# Patient Record
Sex: FEMALE | Race: WHITE | NOT HISPANIC OR LATINO | Employment: UNEMPLOYED | URBAN - METROPOLITAN AREA
[De-identification: names, ages, dates, MRNs, and addresses within clinical notes are randomized per-mention and may not be internally consistent; named-entity substitution may affect disease eponyms.]

---

## 2017-06-12 ENCOUNTER — HISTORICAL (OUTPATIENT)
Dept: RADIOLOGY | Facility: HOSPITAL | Age: 42
End: 2017-06-12

## 2019-04-17 ENCOUNTER — HISTORICAL (OUTPATIENT)
Dept: RESPIRATORY THERAPY | Facility: HOSPITAL | Age: 44
End: 2019-04-17

## 2022-06-15 ENCOUNTER — HOSPITAL ENCOUNTER (OUTPATIENT)
Dept: RADIOLOGY | Facility: HOSPITAL | Age: 47
Discharge: HOME | End: 2022-06-15
Attending: OBSTETRICS & GYNECOLOGY
Payer: MEDICAID

## 2022-06-15 DIAGNOSIS — Z12.31 BREAST CANCER SCREENING BY MAMMOGRAM: ICD-10-CM

## 2022-06-15 PROCEDURE — 77063 BREAST TOMOSYNTHESIS BI: CPT | Mod: 26,,, | Performed by: RADIOLOGY

## 2022-06-15 PROCEDURE — 77067 MAMMO DIGITAL SCREENING BILAT WITH TOMO: ICD-10-PCS | Mod: 26,,, | Performed by: RADIOLOGY

## 2022-06-15 PROCEDURE — 77063 MAMMO DIGITAL SCREENING BILAT WITH TOMO: ICD-10-PCS | Mod: 26,,, | Performed by: RADIOLOGY

## 2022-06-15 PROCEDURE — 77067 SCR MAMMO BI INCL CAD: CPT | Mod: TC

## 2022-06-15 PROCEDURE — 77067 SCR MAMMO BI INCL CAD: CPT | Mod: 26,,, | Performed by: RADIOLOGY

## 2023-03-06 ENCOUNTER — HOSPITAL ENCOUNTER (OUTPATIENT)
Dept: RADIOLOGY | Facility: HOSPITAL | Age: 48
Discharge: HOME | End: 2023-03-06
Attending: NURSE PRACTITIONER
Payer: MEDICAID

## 2023-03-06 ENCOUNTER — CLINICAL SUPPORT (OUTPATIENT)
Dept: RESPIRATORY THERAPY | Facility: HOSPITAL | Age: 48
End: 2023-03-06
Attending: NURSE PRACTITIONER
Payer: MEDICAID

## 2023-03-06 DIAGNOSIS — Z01.818 PREOPERATIVE EXAMINATION, UNSPECIFIED: ICD-10-CM

## 2023-03-06 DIAGNOSIS — Z01.818 OTHER SPECIFIED PRE-OPERATIVE EXAMINATION: Primary | ICD-10-CM

## 2023-03-06 DIAGNOSIS — Z01.818 OTHER SPECIFIED PRE-OPERATIVE EXAMINATION: ICD-10-CM

## 2023-03-06 PROCEDURE — 71045 X-RAY EXAM CHEST 1 VIEW: CPT | Mod: TC

## 2023-03-06 PROCEDURE — 93005 ELECTROCARDIOGRAM TRACING: CPT

## 2023-03-06 PROCEDURE — 93010 ELECTROCARDIOGRAM REPORT: CPT | Mod: ,,, | Performed by: STUDENT IN AN ORGANIZED HEALTH CARE EDUCATION/TRAINING PROGRAM

## 2023-03-06 PROCEDURE — 93010 EKG 12-LEAD: ICD-10-PCS | Mod: ,,, | Performed by: STUDENT IN AN ORGANIZED HEALTH CARE EDUCATION/TRAINING PROGRAM

## 2023-03-24 ENCOUNTER — APPOINTMENT (OUTPATIENT)
Dept: RADIATION THERAPY | Facility: HOSPITAL | Age: 48
End: 2023-03-24
Attending: RADIOLOGY
Payer: MEDICAID

## 2023-03-24 ENCOUNTER — TELEPHONE (OUTPATIENT)
Dept: HEMATOLOGY/ONCOLOGY | Facility: CLINIC | Age: 48
End: 2023-03-24
Payer: MEDICAID

## 2023-03-24 PROCEDURE — 77334 RADIATION TREATMENT AID(S): CPT | Performed by: RADIOLOGY

## 2023-03-24 PROCEDURE — 77290 THER RAD SIMULAJ FIELD CPLX: CPT | Performed by: RADIOLOGY

## 2023-03-28 PROCEDURE — 77300 RADIATION THERAPY DOSE PLAN: CPT | Performed by: RADIOLOGY

## 2023-03-28 PROCEDURE — 77334 RADIATION TREATMENT AID(S): CPT | Performed by: RADIOLOGY

## 2023-03-28 PROCEDURE — 77295 3-D RADIOTHERAPY PLAN: CPT | Performed by: RADIOLOGY

## 2023-03-30 ENCOUNTER — OFFICE VISIT (OUTPATIENT)
Dept: HEMATOLOGY/ONCOLOGY | Facility: CLINIC | Age: 48
End: 2023-03-30
Payer: MEDICAID

## 2023-03-30 VITALS
HEIGHT: 65 IN | WEIGHT: 214.38 LBS | HEART RATE: 101 BPM | RESPIRATION RATE: 18 BRPM | OXYGEN SATURATION: 95 % | TEMPERATURE: 98 F | SYSTOLIC BLOOD PRESSURE: 158 MMHG | BODY MASS INDEX: 35.72 KG/M2 | DIASTOLIC BLOOD PRESSURE: 83 MMHG

## 2023-03-30 DIAGNOSIS — Z51.11 ENCOUNTER FOR CHEMOTHERAPY MANAGEMENT: ICD-10-CM

## 2023-03-30 DIAGNOSIS — C53.9 MALIGNANT NEOPLASM OF CERVIX, UNSPECIFIED SITE: Primary | ICD-10-CM

## 2023-03-30 DIAGNOSIS — C53.8 MALIGNANT NEOPLASM OF OVERLAPPING SITES OF CERVIX: ICD-10-CM

## 2023-03-30 DIAGNOSIS — K59.00 CONSTIPATION, UNSPECIFIED CONSTIPATION TYPE: ICD-10-CM

## 2023-03-30 DIAGNOSIS — R11.0 NAUSEA: Primary | ICD-10-CM

## 2023-03-30 PROCEDURE — 3008F PR BODY MASS INDEX (BMI) DOCUMENTED: ICD-10-PCS | Mod: CPTII,,, | Performed by: STUDENT IN AN ORGANIZED HEALTH CARE EDUCATION/TRAINING PROGRAM

## 2023-03-30 PROCEDURE — 1160F RVW MEDS BY RX/DR IN RCRD: CPT | Mod: CPTII,,, | Performed by: STUDENT IN AN ORGANIZED HEALTH CARE EDUCATION/TRAINING PROGRAM

## 2023-03-30 PROCEDURE — 3077F PR MOST RECENT SYSTOLIC BLOOD PRESSURE >= 140 MM HG: ICD-10-PCS | Mod: CPTII,,, | Performed by: STUDENT IN AN ORGANIZED HEALTH CARE EDUCATION/TRAINING PROGRAM

## 2023-03-30 PROCEDURE — 99205 OFFICE O/P NEW HI 60 MIN: CPT | Mod: S$PBB,,, | Performed by: STUDENT IN AN ORGANIZED HEALTH CARE EDUCATION/TRAINING PROGRAM

## 2023-03-30 PROCEDURE — 99213 OFFICE O/P EST LOW 20 MIN: CPT | Mod: PBBFAC | Performed by: STUDENT IN AN ORGANIZED HEALTH CARE EDUCATION/TRAINING PROGRAM

## 2023-03-30 PROCEDURE — 1159F MED LIST DOCD IN RCRD: CPT | Mod: CPTII,,, | Performed by: STUDENT IN AN ORGANIZED HEALTH CARE EDUCATION/TRAINING PROGRAM

## 2023-03-30 PROCEDURE — 3079F PR MOST RECENT DIASTOLIC BLOOD PRESSURE 80-89 MM HG: ICD-10-PCS | Mod: CPTII,,, | Performed by: STUDENT IN AN ORGANIZED HEALTH CARE EDUCATION/TRAINING PROGRAM

## 2023-03-30 PROCEDURE — 3079F DIAST BP 80-89 MM HG: CPT | Mod: CPTII,,, | Performed by: STUDENT IN AN ORGANIZED HEALTH CARE EDUCATION/TRAINING PROGRAM

## 2023-03-30 PROCEDURE — 99999 PR PBB SHADOW E&M-EST. PATIENT-LVL III: ICD-10-PCS | Mod: PBBFAC,,, | Performed by: STUDENT IN AN ORGANIZED HEALTH CARE EDUCATION/TRAINING PROGRAM

## 2023-03-30 PROCEDURE — 99205 PR OFFICE/OUTPT VISIT, NEW, LEVL V, 60-74 MIN: ICD-10-PCS | Mod: S$PBB,,, | Performed by: STUDENT IN AN ORGANIZED HEALTH CARE EDUCATION/TRAINING PROGRAM

## 2023-03-30 PROCEDURE — 3008F BODY MASS INDEX DOCD: CPT | Mod: CPTII,,, | Performed by: STUDENT IN AN ORGANIZED HEALTH CARE EDUCATION/TRAINING PROGRAM

## 2023-03-30 PROCEDURE — 3077F SYST BP >= 140 MM HG: CPT | Mod: CPTII,,, | Performed by: STUDENT IN AN ORGANIZED HEALTH CARE EDUCATION/TRAINING PROGRAM

## 2023-03-30 PROCEDURE — 99999 PR PBB SHADOW E&M-EST. PATIENT-LVL III: CPT | Mod: PBBFAC,,, | Performed by: STUDENT IN AN ORGANIZED HEALTH CARE EDUCATION/TRAINING PROGRAM

## 2023-03-30 PROCEDURE — 1160F PR REVIEW ALL MEDS BY PRESCRIBER/CLIN PHARMACIST DOCUMENTED: ICD-10-PCS | Mod: CPTII,,, | Performed by: STUDENT IN AN ORGANIZED HEALTH CARE EDUCATION/TRAINING PROGRAM

## 2023-03-30 PROCEDURE — 1159F PR MEDICATION LIST DOCUMENTED IN MEDICAL RECORD: ICD-10-PCS | Mod: CPTII,,, | Performed by: STUDENT IN AN ORGANIZED HEALTH CARE EDUCATION/TRAINING PROGRAM

## 2023-03-30 RX ORDER — ONDANSETRON HYDROCHLORIDE 8 MG/1
8 TABLET, FILM COATED ORAL EVERY 8 HOURS PRN
Qty: 30 TABLET | Refills: 2 | Status: SHIPPED | OUTPATIENT
Start: 2023-03-30 | End: 2024-03-29

## 2023-03-30 RX ORDER — LORAZEPAM 1 MG/1
TABLET ORAL
COMMUNITY
Start: 2023-02-23 | End: 2023-04-12 | Stop reason: SDUPTHER

## 2023-03-30 RX ORDER — AMLODIPINE BESYLATE 5 MG/1
10 TABLET ORAL
COMMUNITY
Start: 2023-02-23 | End: 2023-08-08 | Stop reason: DRUGHIGH

## 2023-03-30 RX ORDER — OLANZAPINE 5 MG/1
5 TABLET ORAL NIGHTLY
Qty: 30 TABLET | Refills: 2 | Status: SHIPPED | OUTPATIENT
Start: 2023-03-30 | End: 2024-03-29

## 2023-03-30 RX ORDER — PROCHLORPERAZINE MALEATE 10 MG
10 TABLET ORAL EVERY 6 HOURS PRN
Qty: 30 TABLET | Refills: 1 | Status: SHIPPED | OUTPATIENT
Start: 2023-03-30 | End: 2024-03-29

## 2023-03-30 RX ORDER — POLYETHYLENE GLYCOL 3350 17 G/17G
17 POWDER, FOR SOLUTION ORAL DAILY
Qty: 30 PACKET | Refills: 0 | Status: SHIPPED | OUTPATIENT
Start: 2023-03-30

## 2023-03-30 RX ORDER — ZOLPIDEM TARTRATE 5 MG/1
TABLET ORAL
COMMUNITY
Start: 2023-03-06

## 2023-03-30 NOTE — PROGRESS NOTES
Subjective:       Patient ID: Lesley Rodriguez is a 47 y.o. female.    Chief Complaint: Cervical Cancer (Patient c/o intermittent vaginal bleeding and cramping.)    Diagnosis: Cervical Cancer Stage 2B     Current Treatment: None    Treatment History: None      HPI  47 yr old who presents for evaluation of cervical cancer in March 2023. Upon initial diagnosis in January, she had the option of definitive chemo/XRT vs total hysterectomy and decided to proceed with surgery with Dr. Rhodes. Unfortunately at the time of surgery, an exam was done that revealed obvious tumor extension onto the anterior of the vagina with some possible involvement of the left lateral vaginal apex. The tumor itself was thought to be approximately 5cm at the time of bimanual exam on 3/20. Given these new progressive findings it was decided that patient would be a better candidate for curative intent if she underwent concurrent chemo/RT. She has met with Dr. Gandhi and was referred to oncology for discussion of concurrent chemotherapy.     Today patient is accompanied by her two daughters. She has some interrmitent cervical bleeding and pain. Otherwise she has no complaints.     Family history includes a mother who had breast cancer at the age of 57 and her grandmother had ovarian cancer in her 40-50s and then developed breast cancer in her 90s as well as several other maternal family members with different types of malignancies.     I discussed her Diagnosis, prognosis, and recommended treatment options. I included NCCN guideline recommendations. I discussed the general toxicities of chemotherapy as well as specific concerns with cisplatin including ototoxicity, renal dysfunction, and neuropathy. She is willing to proceed.       Past Medical History:   Diagnosis Date    Essential (primary) hypertension       History reviewed. No pertinent surgical history.  Social History     Socioeconomic History    Marital status:       Family History    Problem Relation Age of Onset    Breast cancer Mother       Review of patient's allergies indicates:  No Known Allergies   Review of Systems   Constitutional:  Negative for activity change, fever and unexpected weight change.   HENT:  Negative for sore throat.    Eyes:  Negative for visual disturbance.   Respiratory:  Negative for cough and shortness of breath.    Cardiovascular:  Negative for chest pain.   Gastrointestinal:  Negative for abdominal pain, diarrhea, nausea and vomiting.   Endocrine: Negative for polyuria.   Genitourinary:  Negative for dysuria and hot flashes.   Integumentary:  Negative for rash.   Neurological:  Negative for weakness and headaches.   Hematological:  Negative for adenopathy. Bruises/bleeds easily.   Psychiatric/Behavioral:  Negative for confusion. The patient is nervous/anxious.        Objective:      Vitals:    03/30/23 1436   BP: (!) 158/83   Pulse: 101   Resp: 18   Temp: 98.4 °F (36.9 °C)       Physical Exam  Constitutional:       General: She is not in acute distress.     Appearance: Normal appearance. She is not ill-appearing.   HENT:      Head: Normocephalic and atraumatic.      Nose: Nose normal.      Mouth/Throat:      Mouth: Mucous membranes are moist.      Pharynx: Oropharynx is clear.   Eyes:      Extraocular Movements: Extraocular movements intact.      Conjunctiva/sclera: Conjunctivae normal.      Pupils: Pupils are equal, round, and reactive to light.   Cardiovascular:      Rate and Rhythm: Normal rate and regular rhythm.      Pulses: Normal pulses.      Heart sounds: Normal heart sounds. No murmur heard.  Pulmonary:      Effort: Pulmonary effort is normal. No respiratory distress.      Breath sounds: Normal breath sounds.   Abdominal:      General: There is no distension.      Palpations: Abdomen is soft.      Tenderness: There is no abdominal tenderness.   Musculoskeletal:         General: Normal range of motion.      Cervical back: Normal range of motion and neck  supple.      Right lower leg: No edema.      Left lower leg: No edema.   Lymphadenopathy:      Cervical: No cervical adenopathy.   Skin:     General: Skin is warm and dry.   Neurological:      General: No focal deficit present.      Mental Status: She is alert and oriented to person, place, and time.       LABS AND IMAGING REVIEWED IN [facility]    Pathology:  6/21/22: [facility]:IMPRESSION: NEGATIVE    5/28/22 PAP Smear:  Positive- High [place] HPV  Positive- HPV 16    12/6/22 PAP Smear:  Positive- High Risk HPV  Positive- HPV 16    1/3/23 Cervical Biopsy:  Cervical Biopsy, 6 o'clock        - Squamous cell carcinoma, invasive, moderately differentiated  2.   Cervical Biopsy, 8 o'clock,         - Squamous cell carcinoma, invasive, moderately differentiated  3.   Cervical Biopsy, 10 o'clock        - Squamous cell carcinoma, invasive, moderately differentiated  4.   ECC        - Squamous cell carcinoma, invasive, moderately differentiated     Comment: Squamous cell carcinoma comprises almost the entirely of the tissue. Carcinoma involves the full thickness of these biopsies and invades at least 3 mm.    2/3/2023 PET/CT:    Hypermetabolic activity noted within the cervix. No regional or distant metastatic disease.    2/10/23 MRI Pelvis:  4.3cm diameter primary cervical mass. No extension into the parametrial fat appreciated. Small but suspicious appearing left external iliac chain lymph nodes      Assessment:   Stage 2B SCC of Cervix  - Plan for definitive concurrent chemo/XRT with weekly cisplatin 40mg weekly        Plan:      - Refer for genetic testing given oncology family history  - Refer for complete audiogram given plan for cisplatin therapy  - Refer for mediport to Dr. Pollack. Okay if do PIV for C1 if mediport can't be done in time  - Will need chemo education for Cisplatin  - Will refer to dietician   - Will order pre meds  - Labs today:cbc, cmp, urine pregnancy   - rtc in 1 week with MD/labs/treat on 4/6. XRT planned to  begin on 4/5    Elizabeth Kennedy LeJeune, MD  Hematology/Oncology   [facility]

## 2023-03-31 DIAGNOSIS — C53.8 MALIGNANT NEOPLASM OF OVERLAPPING SITES OF CERVIX: ICD-10-CM

## 2023-03-31 DIAGNOSIS — C53.9 MALIGNANT NEOPLASM OF CERVIX, UNSPECIFIED SITE: Primary | ICD-10-CM

## 2023-04-03 ENCOUNTER — OFFICE VISIT (OUTPATIENT)
Dept: SURGERY | Facility: CLINIC | Age: 48
End: 2023-04-03
Payer: MEDICAID

## 2023-04-03 VITALS
OXYGEN SATURATION: 97 % | BODY MASS INDEX: 36.62 KG/M2 | HEART RATE: 80 BPM | DIASTOLIC BLOOD PRESSURE: 78 MMHG | WEIGHT: 219.81 LBS | SYSTOLIC BLOOD PRESSURE: 117 MMHG | RESPIRATION RATE: 18 BRPM | TEMPERATURE: 98 F | HEIGHT: 65 IN

## 2023-04-03 DIAGNOSIS — Z91.89 AT HIGH RISK FOR BREAST CANCER: ICD-10-CM

## 2023-04-03 DIAGNOSIS — I87.2 VENOUS INSUFFICIENCY: ICD-10-CM

## 2023-04-03 DIAGNOSIS — C53.8 MALIGNANT NEOPLASM OF OVERLAPPING SITES OF CERVIX: Primary | ICD-10-CM

## 2023-04-03 DIAGNOSIS — Z12.31 ENCOUNTER FOR SCREENING MAMMOGRAM FOR BREAST CANCER: ICD-10-CM

## 2023-04-03 PROBLEM — Z51.11 ENCOUNTER FOR CHEMOTHERAPY MANAGEMENT: Status: ACTIVE | Noted: 2023-04-03

## 2023-04-03 PROCEDURE — 1159F MED LIST DOCD IN RCRD: CPT | Mod: CPTII,,, | Performed by: SURGERY

## 2023-04-03 PROCEDURE — 1160F PR REVIEW ALL MEDS BY PRESCRIBER/CLIN PHARMACIST DOCUMENTED: ICD-10-PCS | Mod: CPTII,,, | Performed by: SURGERY

## 2023-04-03 PROCEDURE — 99999 PR PBB SHADOW E&M-EST. PATIENT-LVL IV: ICD-10-PCS | Mod: PBBFAC,,, | Performed by: SURGERY

## 2023-04-03 PROCEDURE — 3074F PR MOST RECENT SYSTOLIC BLOOD PRESSURE < 130 MM HG: ICD-10-PCS | Mod: CPTII,,, | Performed by: SURGERY

## 2023-04-03 PROCEDURE — 99205 OFFICE O/P NEW HI 60 MIN: CPT | Mod: S$PBB,,, | Performed by: SURGERY

## 2023-04-03 PROCEDURE — 3078F PR MOST RECENT DIASTOLIC BLOOD PRESSURE < 80 MM HG: ICD-10-PCS | Mod: CPTII,,, | Performed by: SURGERY

## 2023-04-03 PROCEDURE — 1159F PR MEDICATION LIST DOCUMENTED IN MEDICAL RECORD: ICD-10-PCS | Mod: CPTII,,, | Performed by: SURGERY

## 2023-04-03 PROCEDURE — 3008F BODY MASS INDEX DOCD: CPT | Mod: CPTII,,, | Performed by: SURGERY

## 2023-04-03 PROCEDURE — 3008F PR BODY MASS INDEX (BMI) DOCUMENTED: ICD-10-PCS | Mod: CPTII,,, | Performed by: SURGERY

## 2023-04-03 PROCEDURE — 1160F RVW MEDS BY RX/DR IN RCRD: CPT | Mod: CPTII,,, | Performed by: SURGERY

## 2023-04-03 PROCEDURE — 99205 PR OFFICE/OUTPT VISIT, NEW, LEVL V, 60-74 MIN: ICD-10-PCS | Mod: S$PBB,,, | Performed by: SURGERY

## 2023-04-03 PROCEDURE — 99999 PR PBB SHADOW E&M-EST. PATIENT-LVL IV: CPT | Mod: PBBFAC,,, | Performed by: SURGERY

## 2023-04-03 PROCEDURE — 3078F DIAST BP <80 MM HG: CPT | Mod: CPTII,,, | Performed by: SURGERY

## 2023-04-03 PROCEDURE — 99214 OFFICE O/P EST MOD 30 MIN: CPT | Mod: PBBFAC | Performed by: SURGERY

## 2023-04-03 PROCEDURE — 3074F SYST BP LT 130 MM HG: CPT | Mod: CPTII,,, | Performed by: SURGERY

## 2023-04-03 RX ORDER — IBUPROFEN 200 MG
200 TABLET ORAL EVERY 6 HOURS PRN
COMMUNITY
End: 2023-04-05 | Stop reason: CLARIF

## 2023-04-03 NOTE — PROGRESS NOTES
[facility] Breast Surg  Breast Surgical Oncology  New Patient Office Visit - H&P      Referring Provider: Dr. Cha GÓMEZ*   PCP: Frank Tellez Jr, MD     Chief Complaint:   Chief Complaint   Patient presents with    Pre-op Exam     New Patient, Breast Cancer        Subjective:     HPI:  Lesley Estrada is a 47 y.o. female who presents on 4/3/2023 for surgical discussion regarding Mediport Placement. Patient diagnosed with Stage 2B Cervical cancer.      She initially presented with intermittent cervical bleeding and pain. She under went work up 2023 with diagnosis of cervical cancer. She was offered definitive chemo/radiation versus surgical excision with total hysterectomy. She elected to undergo a total hysterectomy by Dr. Rhodes, however, at the time of surgery there was tumor extension noted into the anterior vagina and possible involvement of the left lateral vaginal apex.    She was sent to Dr. Gandhi and Dr. Lejeune for further evaluation and management. After evaluation it was noted that concurrent chemoradiation therapy would be the best next steps in her care.     Imagin. 2022 BL SC MG at [facility]- which revealed no significant masses, calcifications, or other findings are seen in either breast. There has been no significant interval change. BIRADS-1 Negative     2. 2/3/2023 PET/ CT which revealed hypermetabolic activity noted within the cervix. No regional or distant metastatic disease.    3. 2/10/2023 MRI Pelvis- which revealed a 4.3 cm diameter primary cervical mass. No extension into the parametrial fat appreciated. Small but suspicious appearing left external iliac chain lymph nodes      Pathology:  1/3/2023 Cervical Biopsy  Cervical Biopsy, 6 o'clock        - Squamous cell carcinoma, invasive, moderately differentiated  2.   Cervical Biopsy, 8 o'clock,         - Squamous cell carcinoma, invasive, moderately differentiated  3.   Cervical Biopsy, 10  o'clock        - Squamous cell carcinoma, invasive, moderately differentiated  4.   ECC        - Squamous cell carcinoma, invasive, moderately differentiated     Comment: Squamous cell carcinoma comprises almost the entirely of the tissue. Carcinoma involves the full thickness of these biopsies and invades at least 3 mm.       OB/GYN History:  Age at Menarche Onset: 13  Menopausal Status: premenopausal, LMP: 2023  Hysterectomy/Oophorectomy: NA, at age NA  Hormonal birth control (duration): Yes Depo-Provera injections. (Started in  for about 7 years)  Pregnancy History:   Age at first live birth: 17  Hormone Replacement Therapy: No, none  Patient did not breast feed.  Patient denies nipple discharge.   Patient denies to previous breast biopsy.   Patient denies to a personal history of breast cancer.    Other Relevant History:  Prior thoracic RT: none  Genetic testing: No  Ashkenazi Congregation descent: No    Family History:  Family History   Problem Relation Age of Onset    Breast cancer Mother 57    Other Father         Had stints in his heart    Ovarian cancer Maternal Grandmother         50s    Breast cancer Maternal Grandmother         late 80s    Stomach cancer Maternal Grandfather         late 60s, early 70s    Kidney failure Paternal Grandmother     Diabetes Paternal Grandmother     Suicide Paternal Grandfather     Breast cancer Paternal Aunt 75        Past History:  Past Medical History:   Diagnosis Date    Cancer     Breast Cancer    Essential (primary) hypertension         Past Surgical History:   Procedure Laterality Date    Arm surgery Left     Left        Social History     Socioeconomic History    Marital status:    Tobacco Use    Smoking status: Never    Smokeless tobacco: Never   Substance and Sexual Activity    Alcohol use: Yes    Drug use: Never    Sexual activity: Yes        Body mass index is 36.58 kg/m².     Allergy/Medications:   Review of patient's allergies indicates:  No  Known Allergies       Current Outpatient Medications:     amLODIPine (NORVASC) 5 MG tablet, , Disp: , Rfl:     ibuprofen (ADVIL,MOTRIN) 200 MG tablet, Take 200 mg by mouth every 6 (six) hours as needed for Pain. PRN, Disp: , Rfl:     LORazepam (ATIVAN) 1 MG tablet, As needed, Disp: , Rfl:     zolpidem (AMBIEN) 5 MG Tab, , Disp: , Rfl:     OLANZapine (ZYPREXA) 5 MG tablet, Take 1 tablet (5 mg total) by mouth nightly. (Patient not taking: Reported on 4/3/2023), Disp: 30 tablet, Rfl: 2    ondansetron (ZOFRAN) 8 MG tablet, Take 1 tablet (8 mg total) by mouth every 8 (eight) hours as needed for Nausea. (Patient not taking: Reported on 4/3/2023), Disp: 30 tablet, Rfl: 2    polyethylene glycol (GLYCOLAX) 17 gram PwPk, Take 17 g by mouth once daily. (Patient not taking: Reported on 4/3/2023), Disp: 30 packet, Rfl: 0    prochlorperazine (COMPAZINE) 10 MG tablet, Take 1 tablet (10 mg total) by mouth every 6 (six) hours as needed (nausea). (Patient not taking: Reported on 4/3/2023), Disp: 30 tablet, Rfl: 1       Review of Systems:  Review of Systems   Constitutional:  Negative for chills, fever and weight loss.   HENT:  Negative for sore throat.    Eyes:  Negative for blurred vision and double vision.   Respiratory:  Negative for cough and shortness of breath.    Cardiovascular:  Positive for leg swelling. Negative for chest pain and palpitations.        Ankles swelling   Gastrointestinal:  Negative for abdominal pain, constipation, diarrhea, heartburn, nausea and vomiting.   Genitourinary:  Positive for frequency. Negative for dysuria, flank pain, hematuria and urgency.   Musculoskeletal:  Negative for back pain, joint pain and myalgias.   Neurological:  Negative for dizziness and headaches.   Endo/Heme/Allergies:  Does not bruise/bleed easily.   Psychiatric/Behavioral:  Negative for depression. The patient is not nervous/anxious.         Objective:     Vitals:  Blood pressure 117/78, pulse 80, temperature 98 °F (36.7 °C),  "temperature source Oral, resp. rate 18, height 5' 5" (1.651 m), weight 99.7 kg (219 lb 12.8 oz), last menstrual period 04/03/2023, SpO2 97 %.      Physical Exam:  General: The patient is awake, alert and oriented times three. The patient is well nourished and in no acute distress.   Neck: There is no evidence of palpable cervical, supraclavicular or axillary adenopathy. The neck is supple. The thyroid is not enlarged.   Musculoskeletal: The patient has a normal range of motion of her bilateral upper extremities.   Chest: Examination of the chest wall fails to reveal any obvious abnormalities. The lungs are clear to auscultation bilaterally without rales, rhonchi, or wheezing.   Cardiovascular: The heart has a regular rate and rhythm without murmurs, gallops or rubs.  Breast:  Right: Examination of right breast fails to reveal any dominant masses or areas of significant focal nodularity. The nipple is everted without evidence of discharge. There is no skin dimpling with movement of the pectoralis. There are no significant skin changes overlying the breast.   Left: Examination of the left breast fails to reveal any dominant masses or areas of significant focal nodularity. The nipple is everted without evidence of discharge. There is no skin dimpling with movement of the pectoralis. There are no significant skin changes overlying the breast.  Abdomen: The abdomen is soft, flat, nontender and nondistended with no palpable masses or organomegaly.  Integumentary: no rashes or skin lesions present  Neurologic: cranial nerves intact, no signs of peripheral neurological deficit, motor/sensory function intact    Assessment and Discussion:      Cancer Staging   No matching staging information was found for the patient.      Encounter Diagnoses   Name Primary?    Malignant neoplasm of overlapping sites of cervix Yes    Venous insufficiency     At high risk for breast cancer     Encounter for screening mammogram for breast " cancer       She requires chemotherapy and has venous insufficiency.     I then spent about twenty minutes with the patient explaining the procedure of the port placement.  I explained to her that this would be an outpatient procedure with local anesthesia and sedation.  The port would be placed under her skin with a catheter which runs into the major vein in her neck or chest.  I explained to her my initial access site would be the subclavian vein which runs beneath the clavicle. If not successful, then the second option would be to do another incision in the neck to access the internal jugular vein.  The risks and complications of pain, bleeding, infection, blood clot, scarring, the inability to access the port, clogging of the port and pneumothorax/hemothorax requiring chest tube or additional surgery were all explained in detail.      Informed consent was obtained, and we shall arrange the surgery at the earliest available opportunity.    She is scheduled for radiation therapy to start this Wednesday and chemotherapy to start this Thursday.      We will also note she is at elevated lifetime risk for breast cancer based on TC v8 and she has already been referred to genetics.        Plan:     1. Surgery - Mediport placement  Tentative Date: 4/10/2023, will try to move it up  2. Preop - CBC, CMP reviewed from 3/30/2023  3. Surgical instructions and information were reviewed  4. At elevated lifetime risk for breast cancer - SC MG due June 2023, will discuss high-risk screening further after that imaging  5. Genetic Counseling and testing referral already sent will follow-up        All of questions were answered    Alexus Pollack MD  Breast Surgical Oncology     OFFICE VISIT CODING:    Non-face-to-face time included:  Yes Preparing to see the patient such as reviewing the patient record  Yes Obtaining and reviewing separately obtained history  Yes Independently interpreting results  Yes Documenting clinical  information in electronic health record  No Ordering appropriate medications  Yes Ordering appropriate tests  Yes Ordering appropriate procedures (including follow-up)  Yes Referring and communicating with other health care professionals (not separately reported)  Yes Care Coordination (not separately reported)    Face-to-face time included:  Yes Performing a medically necessary appropriate history, examination, and/or evaluation  Yes Communicating results to the patient/family/caregiver  Yes Counseling and educating the patient/family/caregiver  Yes Answering patient/family/caregiver questions    Total Time: 65 minutes    Total time includes both face-to-face and non-face-to-face time personally spent by myself on the day of the visit.

## 2023-04-03 NOTE — H&P (VIEW-ONLY)
[facility] Breast Surg  Breast Surgical Oncology  New Patient Office Visit - H&P      Referring Provider: Dr. Cha GÓMEZ*   PCP: Frank Tellez Jr, MD     Chief Complaint:   Chief Complaint   Patient presents with    Pre-op Exam     New Patient, Breast Cancer        Subjective:     HPI:  Lesley Estrada is a 47 y.o. female who presents on 4/3/2023 for surgical discussion regarding Mediport Placement. Patient diagnosed with Stage 2B Cervical cancer.      She initially presented with intermittent cervical bleeding and pain. She under went work up 2023 with diagnosis of cervical cancer. She was offered definitive chemo/radiation versus surgical excision with total hysterectomy. She elected to undergo a total hysterectomy by Dr. Rhodes, however, at the time of surgery there was tumor extension noted into the anterior vagina and possible involvement of the left lateral vaginal apex.    She was sent to Dr. Gandhi and Dr. Lejeune for further evaluation and management. After evaluation it was noted that concurrent chemoradiation therapy would be the best next steps in her care.     Imagin. 2022 BL SC MG at [facility]- which revealed no significant masses, calcifications, or other findings are seen in either breast. There has been no significant interval change. BIRADS-1 Negative     2. 2/3/2023 PET/ CT which revealed hypermetabolic activity noted within the cervix. No regional or distant metastatic disease.    3. 2/10/2023 MRI Pelvis- which revealed a 4.3 cm diameter primary cervical mass. No extension into the parametrial fat appreciated. Small but suspicious appearing left external iliac chain lymph nodes      Pathology:  1/3/2023 Cervical Biopsy  Cervical Biopsy, 6 o'clock        - Squamous cell carcinoma, invasive, moderately differentiated  2.   Cervical Biopsy, 8 o'clock,         - Squamous cell carcinoma, invasive, moderately differentiated  3.   Cervical Biopsy, 10  o'clock        - Squamous cell carcinoma, invasive, moderately differentiated  4.   ECC        - Squamous cell carcinoma, invasive, moderately differentiated     Comment: Squamous cell carcinoma comprises almost the entirely of the tissue. Carcinoma involves the full thickness of these biopsies and invades at least 3 mm.       OB/GYN History:  Age at Menarche Onset: 13  Menopausal Status: premenopausal, LMP: 2023  Hysterectomy/Oophorectomy: NA, at age NA  Hormonal birth control (duration): Yes Depo-Provera injections. (Started in  for about 7 years)  Pregnancy History:   Age at first live birth: 17  Hormone Replacement Therapy: No, none  Patient did not breast feed.  Patient denies nipple discharge.   Patient denies to previous breast biopsy.   Patient denies to a personal history of breast cancer.    Other Relevant History:  Prior thoracic RT: none  Genetic testing: No  Ashkenazi Zoroastrianism descent: No    Family History:  Family History   Problem Relation Age of Onset    Breast cancer Mother 57    Other Father         Had stints in his heart    Ovarian cancer Maternal Grandmother         50s    Breast cancer Maternal Grandmother         late 80s    Stomach cancer Maternal Grandfather         late 60s, early 70s    Kidney failure Paternal Grandmother     Diabetes Paternal Grandmother     Suicide Paternal Grandfather     Breast cancer Paternal Aunt 75        Past History:  Past Medical History:   Diagnosis Date    Cancer     Breast Cancer    Essential (primary) hypertension         Past Surgical History:   Procedure Laterality Date    Arm surgery Left     Left        Social History     Socioeconomic History    Marital status:    Tobacco Use    Smoking status: Never    Smokeless tobacco: Never   Substance and Sexual Activity    Alcohol use: Yes    Drug use: Never    Sexual activity: Yes        Body mass index is 36.58 kg/m².     Allergy/Medications:   Review of patient's allergies indicates:  No  Known Allergies       Current Outpatient Medications:     amLODIPine (NORVASC) 5 MG tablet, , Disp: , Rfl:     ibuprofen (ADVIL,MOTRIN) 200 MG tablet, Take 200 mg by mouth every 6 (six) hours as needed for Pain. PRN, Disp: , Rfl:     LORazepam (ATIVAN) 1 MG tablet, As needed, Disp: , Rfl:     zolpidem (AMBIEN) 5 MG Tab, , Disp: , Rfl:     OLANZapine (ZYPREXA) 5 MG tablet, Take 1 tablet (5 mg total) by mouth nightly. (Patient not taking: Reported on 4/3/2023), Disp: 30 tablet, Rfl: 2    ondansetron (ZOFRAN) 8 MG tablet, Take 1 tablet (8 mg total) by mouth every 8 (eight) hours as needed for Nausea. (Patient not taking: Reported on 4/3/2023), Disp: 30 tablet, Rfl: 2    polyethylene glycol (GLYCOLAX) 17 gram PwPk, Take 17 g by mouth once daily. (Patient not taking: Reported on 4/3/2023), Disp: 30 packet, Rfl: 0    prochlorperazine (COMPAZINE) 10 MG tablet, Take 1 tablet (10 mg total) by mouth every 6 (six) hours as needed (nausea). (Patient not taking: Reported on 4/3/2023), Disp: 30 tablet, Rfl: 1       Review of Systems:  Review of Systems   Constitutional:  Negative for chills, fever and weight loss.   HENT:  Negative for sore throat.    Eyes:  Negative for blurred vision and double vision.   Respiratory:  Negative for cough and shortness of breath.    Cardiovascular:  Positive for leg swelling. Negative for chest pain and palpitations.        Ankles swelling   Gastrointestinal:  Negative for abdominal pain, constipation, diarrhea, heartburn, nausea and vomiting.   Genitourinary:  Positive for frequency. Negative for dysuria, flank pain, hematuria and urgency.   Musculoskeletal:  Negative for back pain, joint pain and myalgias.   Neurological:  Negative for dizziness and headaches.   Endo/Heme/Allergies:  Does not bruise/bleed easily.   Psychiatric/Behavioral:  Negative for depression. The patient is not nervous/anxious.         Objective:     Vitals:  Blood pressure 117/78, pulse 80, temperature 98 °F (36.7 °C),  "temperature source Oral, resp. rate 18, height 5' 5" (1.651 m), weight 99.7 kg (219 lb 12.8 oz), last menstrual period 04/03/2023, SpO2 97 %.      Physical Exam:  General: The patient is awake, alert and oriented times three. The patient is well nourished and in no acute distress.   Neck: There is no evidence of palpable cervical, supraclavicular or axillary adenopathy. The neck is supple. The thyroid is not enlarged.   Musculoskeletal: The patient has a normal range of motion of her bilateral upper extremities.   Chest: Examination of the chest wall fails to reveal any obvious abnormalities. The lungs are clear to auscultation bilaterally without rales, rhonchi, or wheezing.   Cardiovascular: The heart has a regular rate and rhythm without murmurs, gallops or rubs.  Breast:  Right: Examination of right breast fails to reveal any dominant masses or areas of significant focal nodularity. The nipple is everted without evidence of discharge. There is no skin dimpling with movement of the pectoralis. There are no significant skin changes overlying the breast.   Left: Examination of the left breast fails to reveal any dominant masses or areas of significant focal nodularity. The nipple is everted without evidence of discharge. There is no skin dimpling with movement of the pectoralis. There are no significant skin changes overlying the breast.  Abdomen: The abdomen is soft, flat, nontender and nondistended with no palpable masses or organomegaly.  Integumentary: no rashes or skin lesions present  Neurologic: cranial nerves intact, no signs of peripheral neurological deficit, motor/sensory function intact    Assessment and Discussion:      Cancer Staging   No matching staging information was found for the patient.      Encounter Diagnoses   Name Primary?    Malignant neoplasm of overlapping sites of cervix Yes    Venous insufficiency     At high risk for breast cancer     Encounter for screening mammogram for breast " cancer       She requires chemotherapy and has venous insufficiency.     I then spent about twenty minutes with the patient explaining the procedure of the port placement.  I explained to her that this would be an outpatient procedure with local anesthesia and sedation.  The port would be placed under her skin with a catheter which runs into the major vein in her neck or chest.  I explained to her my initial access site would be the subclavian vein which runs beneath the clavicle. If not successful, then the second option would be to do another incision in the neck to access the internal jugular vein.  The risks and complications of pain, bleeding, infection, blood clot, scarring, the inability to access the port, clogging of the port and pneumothorax/hemothorax requiring chest tube or additional surgery were all explained in detail.      Informed consent was obtained, and we shall arrange the surgery at the earliest available opportunity.    She is scheduled for radiation therapy to start this Wednesday and chemotherapy to start this Thursday.      We will also note she is at elevated lifetime risk for breast cancer based on TC v8 and she has already been referred to genetics.        Plan:     1. Surgery - Mediport placement  Tentative Date: 4/10/2023, will try to move it up  2. Preop - CBC, CMP reviewed from 3/30/2023  3. Surgical instructions and information were reviewed  4. At elevated lifetime risk for breast cancer - SC MG due June 2023, will discuss high-risk screening further after that imaging  5. Genetic Counseling and testing referral already sent will follow-up        All of questions were answered    Alexus Pollack MD  Breast Surgical Oncology     OFFICE VISIT CODING:    Non-face-to-face time included:  Yes Preparing to see the patient such as reviewing the patient record  Yes Obtaining and reviewing separately obtained history  Yes Independently interpreting results  Yes Documenting clinical  information in electronic health record  No Ordering appropriate medications  Yes Ordering appropriate tests  Yes Ordering appropriate procedures (including follow-up)  Yes Referring and communicating with other health care professionals (not separately reported)  Yes Care Coordination (not separately reported)    Face-to-face time included:  Yes Performing a medically necessary appropriate history, examination, and/or evaluation  Yes Communicating results to the patient/family/caregiver  Yes Counseling and educating the patient/family/caregiver  Yes Answering patient/family/caregiver questions    Total Time: 65 minutes    Total time includes both face-to-face and non-face-to-face time personally spent by myself on the day of the visit.

## 2023-04-04 ENCOUNTER — OFFICE VISIT (OUTPATIENT)
Dept: HEMATOLOGY/ONCOLOGY | Facility: CLINIC | Age: 48
End: 2023-04-04
Payer: MEDICAID

## 2023-04-04 VITALS
DIASTOLIC BLOOD PRESSURE: 92 MMHG | BODY MASS INDEX: 36.48 KG/M2 | HEART RATE: 80 BPM | SYSTOLIC BLOOD PRESSURE: 151 MMHG | TEMPERATURE: 98 F | WEIGHT: 218.94 LBS | RESPIRATION RATE: 18 BRPM | HEIGHT: 65 IN | OXYGEN SATURATION: 100 %

## 2023-04-04 DIAGNOSIS — Z71.9 ENCOUNTER FOR EDUCATION: ICD-10-CM

## 2023-04-04 DIAGNOSIS — Z51.11 ENCOUNTER FOR CHEMOTHERAPY MANAGEMENT: ICD-10-CM

## 2023-04-04 DIAGNOSIS — C53.8 MALIGNANT NEOPLASM OF OVERLAPPING SITES OF CERVIX: ICD-10-CM

## 2023-04-04 DIAGNOSIS — C53.9 MALIGNANT NEOPLASM OF CERVIX, UNSPECIFIED SITE: Primary | ICD-10-CM

## 2023-04-04 PROCEDURE — 3080F DIAST BP >= 90 MM HG: CPT | Mod: CPTII,,,

## 2023-04-04 PROCEDURE — 1159F PR MEDICATION LIST DOCUMENTED IN MEDICAL RECORD: ICD-10-PCS | Mod: CPTII,,,

## 2023-04-04 PROCEDURE — 3077F PR MOST RECENT SYSTOLIC BLOOD PRESSURE >= 140 MM HG: ICD-10-PCS | Mod: CPTII,,,

## 2023-04-04 PROCEDURE — 1160F RVW MEDS BY RX/DR IN RCRD: CPT | Mod: CPTII,,,

## 2023-04-04 PROCEDURE — 99215 OFFICE O/P EST HI 40 MIN: CPT | Mod: S$PBB,,,

## 2023-04-04 PROCEDURE — 3080F PR MOST RECENT DIASTOLIC BLOOD PRESSURE >= 90 MM HG: ICD-10-PCS | Mod: CPTII,,,

## 2023-04-04 PROCEDURE — 3008F BODY MASS INDEX DOCD: CPT | Mod: CPTII,,,

## 2023-04-04 PROCEDURE — 1159F MED LIST DOCD IN RCRD: CPT | Mod: CPTII,,,

## 2023-04-04 PROCEDURE — 3008F PR BODY MASS INDEX (BMI) DOCUMENTED: ICD-10-PCS | Mod: CPTII,,,

## 2023-04-04 PROCEDURE — 3077F SYST BP >= 140 MM HG: CPT | Mod: CPTII,,,

## 2023-04-04 PROCEDURE — 1160F PR REVIEW ALL MEDS BY PRESCRIBER/CLIN PHARMACIST DOCUMENTED: ICD-10-PCS | Mod: CPTII,,,

## 2023-04-04 PROCEDURE — 99213 OFFICE O/P EST LOW 20 MIN: CPT | Mod: PBBFAC

## 2023-04-04 PROCEDURE — 99999 PR PBB SHADOW E&M-EST. PATIENT-LVL III: CPT | Mod: PBBFAC,,,

## 2023-04-04 PROCEDURE — 99999 PR PBB SHADOW E&M-EST. PATIENT-LVL III: ICD-10-PCS | Mod: PBBFAC,,,

## 2023-04-04 PROCEDURE — 99215 PR OFFICE/OUTPT VISIT, EST, LEVL V, 40-54 MIN: ICD-10-PCS | Mod: S$PBB,,,

## 2023-04-04 RX ORDER — SODIUM CHLORIDE 0.9 % (FLUSH) 0.9 %
10 SYRINGE (ML) INJECTION
Status: CANCELLED | OUTPATIENT
Start: 2023-04-06

## 2023-04-04 RX ORDER — SODIUM CHLORIDE, SODIUM LACTATE, POTASSIUM CHLORIDE, CALCIUM CHLORIDE 600; 310; 30; 20 MG/100ML; MG/100ML; MG/100ML; MG/100ML
INJECTION, SOLUTION INTRAVENOUS CONTINUOUS
Status: CANCELLED | OUTPATIENT
Start: 2023-04-04

## 2023-04-04 RX ORDER — HEPARIN 100 UNIT/ML
500 SYRINGE INTRAVENOUS
Status: CANCELLED | OUTPATIENT
Start: 2023-04-06

## 2023-04-04 NOTE — PROGRESS NOTES
Subjective:       Patient ID: Lesley Estrada is a 47 y.o. female.    Chief Complaint: Cervical Cancer (Pt reports no issues or concerns)    Diagnosis: Cervical Cancer Stage 2B     Current Treatment:   Cisplatin 40 mg q.week x6 cycles, rescanned with PET/CT three-month once completion of 6 cycles/radiation    Treatment History: None      HPI  47 yr old who presents for evaluation of cervical cancer in March 2023. Upon initial diagnosis in January, she had the option of definitive chemo/XRT vs total hysterectomy and decided to proceed with surgery with Dr. Rhodes. Unfortunately at the time of surgery, an exam was done that revealed obvious tumor extension onto the anterior of the vagina with some possible involvement of the left lateral vaginal apex. The tumor itself was thought to be approximately 5cm at the time of bimanual exam on 3/20. Given these new progressive findings it was decided that patient would be a better candidate for curative intent if she underwent concurrent chemo/RT. She has met with Dr. Gandhi and was referred to oncology for discussion of concurrent chemotherapy.     Today patient is accompanied by her two daughters. She has some interrmitent cervical bleeding and pain. Otherwise she has no complaints.     Family history includes a mother who had breast cancer at the age of 57 and her grandmother had ovarian cancer in her 40-50s and then developed breast cancer in her 90s as well as several other maternal family members with different types of malignancies.     I discussed her Diagnosis, prognosis, and recommended treatment options. I included NCCN guideline recommendations. I discussed the general toxicities of chemotherapy as well as specific concerns with cisplatin including ototoxicity, renal dysfunction, and neuropathy. She is willing to proceed.     Interval History:  She returns to clinic today for chemotherapy education for weekly cisplatin and for treatment clearance to begin her  1st cycle of cisplatin on 04/06/2023.  She presents today with her daughter.  She is doing well today, without any concerns or complaints.  She is scheduled to begin radiation tomorrow.  She is scheduled to have her MediPort placement 04/10/2023 with Dr. Pollack.  She has an appointment scheduled 4/5 with audiology for a baseline audiogram.  Chemotherapy was discussed in full detail with her and her daughter.  All questions and concerns were answered to the satisfaction of the patient.  Consent was obtained.  She denies any shortness of breath, chest pain, nausea, vomiting, diarrhea, changes in stool, pain, fatigue, recent hospitalizations or illnesses.      Past Medical History:   Diagnosis Date    Cancer 2023    Breast Cancer    Essential (primary) hypertension       Past Surgical History:   Procedure Laterality Date    Arm surgery Left     Left     Social History     Socioeconomic History    Marital status:    Tobacco Use    Smoking status: Never    Smokeless tobacco: Never   Substance and Sexual Activity    Alcohol use: Yes    Drug use: Never    Sexual activity: Yes      Family History   Problem Relation Age of Onset    Breast cancer Mother 57    Other Father         Had stints in his heart    Ovarian cancer Maternal Grandmother         50s    Breast cancer Maternal Grandmother         late 80s    Stomach cancer Maternal Grandfather         late 60s, early 70s    Kidney failure Paternal Grandmother     Diabetes Paternal Grandmother     Suicide Paternal Grandfather     Breast cancer Paternal Aunt 75      Review of patient's allergies indicates:  No Known Allergies       Review of Systems   Constitutional:  Negative for activity change, fever and unexpected weight change.   HENT:  Negative for sore throat.    Eyes:  Negative for visual disturbance.   Respiratory:  Negative for cough and shortness of breath.    Cardiovascular:  Negative for chest pain.   Gastrointestinal:  Negative for abdominal pain,  diarrhea, nausea and vomiting.   Endocrine: Negative for polyuria.   Genitourinary:  Negative for dysuria and hot flashes.   Integumentary:  Negative for rash.   Neurological:  Negative for weakness and headaches.   Hematological:  Negative for adenopathy. Does not bruise/bleed easily.   Psychiatric/Behavioral:  Negative for confusion. The patient is nervous/anxious.        Objective:      Vitals:    04/04/23 1407   BP: (!) 151/92   Pulse: 80   Resp: 18   Temp: 98.2 °F (36.8 °C)       Physical Exam  Constitutional:       General: She is not in acute distress.     Appearance: Normal appearance. She is not ill-appearing.   HENT:      Head: Normocephalic and atraumatic.      Nose: Nose normal.      Mouth/Throat:      Mouth: Mucous membranes are moist.      Pharynx: Oropharynx is clear.   Eyes:      Extraocular Movements: Extraocular movements intact.      Conjunctiva/sclera: Conjunctivae normal.      Pupils: Pupils are equal, round, and reactive to light.   Cardiovascular:      Rate and Rhythm: Normal rate and regular rhythm.      Pulses: Normal pulses.      Heart sounds: Normal heart sounds. No murmur heard.  Pulmonary:      Effort: Pulmonary effort is normal. No respiratory distress.      Breath sounds: Normal breath sounds.   Abdominal:      General: There is no distension.      Palpations: Abdomen is soft.      Tenderness: There is no abdominal tenderness.   Musculoskeletal:         General: Normal range of motion.      Cervical back: Normal range of motion and neck supple.      Right lower leg: No edema.      Left lower leg: No edema.   Lymphadenopathy:      Cervical: No cervical adenopathy.      Comments: No adenopathy noted bilaterally.   Skin:     General: Skin is warm and dry.   Neurological:      General: No focal deficit present.      Mental Status: She is alert and oriented to person, place, and time.       LABS AND IMAGING REVIEWED IN [facility]    Pathology:  6/21/22: [facility]:IMPRESSION: NEGATIVE    5/28/22 PAP  Smear:  Positive- High [place] HPV  Positive- HPV 16    12/6/22 PAP Smear:  Positive- High Risk HPV  Positive- HPV 16    1/3/23 Cervical Biopsy:  Cervical Biopsy, 6 o'clock        - Squamous cell carcinoma, invasive, moderately differentiated  2.   Cervical Biopsy, 8 o'clock,         - Squamous cell carcinoma, invasive, moderately differentiated  3.   Cervical Biopsy, 10 o'clock        - Squamous cell carcinoma, invasive, moderately differentiated  4.   ECC        - Squamous cell carcinoma, invasive, moderately differentiated     Comment: Squamous cell carcinoma comprises almost the entirely of the tissue. Carcinoma involves the full thickness of these biopsies and invades at least 3 mm.    2/3/2023 PET/CT:    Hypermetabolic activity noted within the cervix. No regional or distant metastatic disease.    2/10/23 MRI Pelvis:  4.3cm diameter primary cervical mass. No extension into the parametrial fat appreciated. Small but suspicious appearing left external iliac chain lymph nodes      Assessment:   Stage 2B SCC of Cervix   Plan for definitive concurrent chemo/XRT with weekly cisplatin 40mg weekly  Followed by Dr. Gandhi for radiation, due to begin radiation therapy 04/05/2023.  Audiology visit scheduled for 04/05/2023.  Currently scheduled for 04/06/2023 to begin cisplatin  MediPort currently scheduled for 04/10/2023 with Dr. Pollack  Family history of cancer  She has been referred to genetic testing        Plan:   Labs stable.   Continue with C1 of weekly cisplatin as scheduled 4/6/2023.  Mediport placement scheduled 4/10/2023.  Audiology appt scheduled 4/5/2023.  Radiation to begin 4/5/2023.  RTC in one week prior to chemo with NP for FU/lab/treat    Chemotherapy teaching was provided to patient. The plan of care including chemotherapy regimen, schedule expectations, potential side effects, as well as prevention/management of side effects were discussed in detail. Office contact information was provided along with  instructions on symptoms that warrant a call to the office, for example a temperature of > 100.5, uncontrolled side effects, severe fatigue etc. Time was given for patient/ family members to ask questions. All questions answered to the satisfaction of the patient and they verbalized understanding. They were instructed to call with any concerns or additional questions.     I spent 20 minutes preparing for education session reviewing clinic note, labs, scans, pathology, and treatment plan. I spent 40 minutes face-to-face with patient.       SAIRA Preston-C  Oncology/Hematology   Cancer Center [facility]

## 2023-04-05 ENCOUNTER — OFFICE VISIT (OUTPATIENT)
Dept: RADIATION THERAPY | Facility: HOSPITAL | Age: 48
End: 2023-04-05
Attending: RADIOLOGY
Payer: MEDICAID

## 2023-04-05 ENCOUNTER — CLINICAL SUPPORT (OUTPATIENT)
Dept: AUDIOLOGY | Facility: HOSPITAL | Age: 48
End: 2023-04-05
Payer: MEDICAID

## 2023-04-05 DIAGNOSIS — C53.8 MALIGNANT NEOPLASM OF OVERLAPPING SITES OF CERVIX: ICD-10-CM

## 2023-04-05 PROCEDURE — 77412 RADIATION TX DELIVERY LVL 3: CPT | Performed by: RADIOLOGY

## 2023-04-05 PROCEDURE — 77280 THER RAD SIMULAJ FIELD SMPL: CPT | Performed by: RADIOLOGY

## 2023-04-05 PROCEDURE — 92557 COMPREHENSIVE HEARING TEST: CPT

## 2023-04-05 PROCEDURE — 92567 TYMPANOMETRY: CPT

## 2023-04-05 RX ORDER — ACETAMINOPHEN 500 MG
1000 TABLET ORAL EVERY 6 HOURS PRN
COMMUNITY

## 2023-04-05 NOTE — PROGRESS NOTES
AUDIOLOGY AUDIOMETRIC EVALUATION      Name:   Lesley Estrada  :  1975  Age:  47 y.o.  Date of Evaluation:  2023    History:  Reason for visit:   is seen today at the request of Oncology to obtain a baseline evaluation of hearing.        Tinnitus:   never     Dizziness:  no    Noise Exposure: no    Aural Fullness:  no    Otalgia:  no    Family history of hearing loss:  no    Other significant history: none     Otoscopic Evaluation:  Normal ear canal/TM bilaterally          Immittance Measures: 226 Hz       Right Ear: Middle ear pressure and eardrm mobility within defined limits       Left Ear: Middle ear pressure and eardrm mobility within defined limits    Audiological Evaluation:    Test technique:  Pure Tone Audiometry     Reliability:   excellent    Pure Tone Audiometry:  Revealed normal hearing from 250-8000 Hz bilaterally.     Speech Audiometry:        Right Ear:  Speech Reception Threshold (SRT) was obtained at 10 dBHL                 Word Recognition scores were excellent in quiet when words were presented at 50 dBHL       Left Ear:  Speech Reception Threshold (SRT) was obtained at 10 dBHL                 Word Recognition scores were excellent in quiet when words were presented at 50 dBHL    Distortion Product Otoacoustic Emissions:        Right Ear: Present from 8574-1210 Hz, with exception of 3000 Hz        Left Ear:  Present from 3492-2912 Hz    Present OAEs suggest normal cochlear outer hair cell function.      IMPRESSIONS:  Today's test results are normal hearing sensitivity.   Patient was counseled with regard to the findings.    RECOMMENDATIONS:  Retest hearing immediately per any subjective changes per patient, or when finished with Chemo if no changes.     PATIENT EDUCATION:   Discussed results and recommendations with .    Questions were addressed and the patient was encouraged to contact our department should concerns arise.      Genet Santoyo,  CCC-A  Audiology Clinical Manager

## 2023-04-06 ENCOUNTER — INFUSION (OUTPATIENT)
Dept: INFUSION THERAPY | Facility: HOSPITAL | Age: 48
End: 2023-04-06
Attending: OBSTETRICS & GYNECOLOGY
Payer: MEDICAID

## 2023-04-06 VITALS
RESPIRATION RATE: 18 BRPM | OXYGEN SATURATION: 100 % | HEART RATE: 101 BPM | DIASTOLIC BLOOD PRESSURE: 83 MMHG | SYSTOLIC BLOOD PRESSURE: 136 MMHG | WEIGHT: 216 LBS | TEMPERATURE: 98 F | HEIGHT: 65 IN | BODY MASS INDEX: 35.99 KG/M2

## 2023-04-06 DIAGNOSIS — C53.9 MALIGNANT NEOPLASM OF CERVIX, UNSPECIFIED SITE: Primary | ICD-10-CM

## 2023-04-06 DIAGNOSIS — C53.8 MALIGNANT NEOPLASM OF OVERLAPPING SITES OF CERVIX: Primary | ICD-10-CM

## 2023-04-06 PROCEDURE — 96366 THER/PROPH/DIAG IV INF ADDON: CPT

## 2023-04-06 PROCEDURE — 77412 RADIATION TX DELIVERY LVL 3: CPT | Performed by: RADIOLOGY

## 2023-04-06 PROCEDURE — 96413 CHEMO IV INFUSION 1 HR: CPT

## 2023-04-06 PROCEDURE — 63600175 PHARM REV CODE 636 W HCPCS

## 2023-04-06 PROCEDURE — 96375 TX/PRO/DX INJ NEW DRUG ADDON: CPT

## 2023-04-06 PROCEDURE — 25000003 PHARM REV CODE 250

## 2023-04-06 PROCEDURE — 96367 TX/PROPH/DG ADDL SEQ IV INF: CPT

## 2023-04-06 PROCEDURE — 96360 HYDRATION IV INFUSION INIT: CPT

## 2023-04-06 RX ORDER — HEPARIN 100 UNIT/ML
500 SYRINGE INTRAVENOUS
Status: DISPENSED | OUTPATIENT
Start: 2023-04-06

## 2023-04-06 RX ORDER — SODIUM CHLORIDE 0.9 % (FLUSH) 0.9 %
10 SYRINGE (ML) INJECTION
Status: ACTIVE | OUTPATIENT
Start: 2023-04-06

## 2023-04-06 RX ADMIN — CISPLATIN 85 MG: 1 INJECTION INTRAVENOUS at 10:04

## 2023-04-06 RX ADMIN — APREPITANT 130 MG: 130 INJECTION, EMULSION INTRAVENOUS at 09:04

## 2023-04-06 RX ADMIN — POTASSIUM CHLORIDE: 2 INJECTION, SOLUTION, CONCENTRATE INTRAVENOUS at 12:04

## 2023-04-06 RX ADMIN — SODIUM CHLORIDE 1000 ML: 9 INJECTION, SOLUTION INTRAVENOUS at 09:04

## 2023-04-06 RX ADMIN — DEXAMETHASONE SODIUM PHOSPHATE 0.25 MG: 10 INJECTION, SOLUTION INTRAMUSCULAR; INTRAVENOUS at 10:04

## 2023-04-07 PROCEDURE — 77412 RADIATION TX DELIVERY LVL 3: CPT | Performed by: RADIOLOGY

## 2023-04-09 ENCOUNTER — ANESTHESIA EVENT (OUTPATIENT)
Dept: SURGERY | Facility: HOSPITAL | Age: 48
End: 2023-04-09
Payer: MEDICAID

## 2023-04-09 RX ORDER — SODIUM CHLORIDE, SODIUM LACTATE, POTASSIUM CHLORIDE, CALCIUM CHLORIDE 600; 310; 30; 20 MG/100ML; MG/100ML; MG/100ML; MG/100ML
INJECTION, SOLUTION INTRAVENOUS CONTINUOUS
Status: CANCELLED | OUTPATIENT
Start: 2023-04-09

## 2023-04-09 RX ORDER — SODIUM CHLORIDE 9 MG/ML
INJECTION, SOLUTION INTRAVENOUS CONTINUOUS
Status: CANCELLED | OUTPATIENT
Start: 2023-04-09

## 2023-04-09 RX ORDER — SODIUM CHLORIDE, SODIUM GLUCONATE, SODIUM ACETATE, POTASSIUM CHLORIDE AND MAGNESIUM CHLORIDE 30; 37; 368; 526; 502 MG/100ML; MG/100ML; MG/100ML; MG/100ML; MG/100ML
INJECTION, SOLUTION INTRAVENOUS CONTINUOUS
Status: CANCELLED | OUTPATIENT
Start: 2023-04-09 | End: 2023-05-09

## 2023-04-10 ENCOUNTER — HOSPITAL ENCOUNTER (OUTPATIENT)
Facility: HOSPITAL | Age: 48
Discharge: HOME | End: 2023-04-10
Attending: SURGERY | Admitting: SURGERY
Payer: MEDICAID

## 2023-04-10 ENCOUNTER — ANESTHESIA (OUTPATIENT)
Dept: SURGERY | Facility: HOSPITAL | Age: 48
End: 2023-04-10
Payer: MEDICAID

## 2023-04-10 ENCOUNTER — DOCUMENTATION ONLY (OUTPATIENT)
Dept: HEMATOLOGY/ONCOLOGY | Facility: CLINIC | Age: 48
End: 2023-04-10
Payer: MEDICAID

## 2023-04-10 VITALS
DIASTOLIC BLOOD PRESSURE: 86 MMHG | BODY MASS INDEX: 36.18 KG/M2 | OXYGEN SATURATION: 100 % | RESPIRATION RATE: 16 BRPM | TEMPERATURE: 97 F | WEIGHT: 217.13 LBS | HEIGHT: 65 IN | HEART RATE: 72 BPM | SYSTOLIC BLOOD PRESSURE: 145 MMHG

## 2023-04-10 DIAGNOSIS — Z98.890 S/P VASCULAR SURGERY: Primary | ICD-10-CM

## 2023-04-10 DIAGNOSIS — I87.2 VENOUS INSUFFICIENCY: ICD-10-CM

## 2023-04-10 DIAGNOSIS — C53.8 MALIGNANT NEOPLASM OF OVERLAPPING SITES OF CERVIX: ICD-10-CM

## 2023-04-10 LAB
B-HCG UR QL: NEGATIVE
CTP QC/QA: YES

## 2023-04-10 PROCEDURE — 77001 FLUOROGUIDE FOR VEIN DEVICE: CPT | Mod: 26,,, | Performed by: SURGERY

## 2023-04-10 PROCEDURE — 71000033 HC RECOVERY, INTIAL HOUR: Performed by: SURGERY

## 2023-04-10 PROCEDURE — 25000003 PHARM REV CODE 250: Performed by: NURSE ANESTHETIST, CERTIFIED REGISTERED

## 2023-04-10 PROCEDURE — D9220A PRA ANESTHESIA: ICD-10-PCS | Mod: ANES,,, | Performed by: ANESTHESIOLOGY

## 2023-04-10 PROCEDURE — 77001 CHG FLUOROGUIDE CNTRL VEN ACCESS,PLACE,REPLACE,REMOVE: ICD-10-PCS | Mod: 26,,, | Performed by: SURGERY

## 2023-04-10 PROCEDURE — 37000009 HC ANESTHESIA EA ADD 15 MINS: Performed by: SURGERY

## 2023-04-10 PROCEDURE — 25000003 PHARM REV CODE 250: Performed by: PHYSICIAN ASSISTANT

## 2023-04-10 PROCEDURE — 63600175 PHARM REV CODE 636 W HCPCS: Performed by: ANESTHESIOLOGY

## 2023-04-10 PROCEDURE — D9220A PRA ANESTHESIA: Mod: ANES,,, | Performed by: ANESTHESIOLOGY

## 2023-04-10 PROCEDURE — 77336 RADIATION PHYSICS CONSULT: CPT | Performed by: RADIOLOGY

## 2023-04-10 PROCEDURE — 36561 INSERT TUNNELED CV CATH: CPT | Mod: LT,,, | Performed by: SURGERY

## 2023-04-10 PROCEDURE — 77412 RADIATION TX DELIVERY LVL 3: CPT | Performed by: RADIOLOGY

## 2023-04-10 PROCEDURE — 36000706: Performed by: SURGERY

## 2023-04-10 PROCEDURE — 36000707: Performed by: SURGERY

## 2023-04-10 PROCEDURE — 25000003 PHARM REV CODE 250: Performed by: SURGERY

## 2023-04-10 PROCEDURE — 37000008 HC ANESTHESIA 1ST 15 MINUTES: Performed by: SURGERY

## 2023-04-10 PROCEDURE — 63600175 PHARM REV CODE 636 W HCPCS: Performed by: NURSE ANESTHETIST, CERTIFIED REGISTERED

## 2023-04-10 PROCEDURE — 25000003 PHARM REV CODE 250: Performed by: ANESTHESIOLOGY

## 2023-04-10 PROCEDURE — 71000016 HC POSTOP RECOV ADDL HR: Performed by: SURGERY

## 2023-04-10 PROCEDURE — 36561 PR INSERT TUNNELED CV CATH WITH PORT: ICD-10-PCS | Mod: LT,,, | Performed by: SURGERY

## 2023-04-10 PROCEDURE — 00532 ANES ACCESS CTR VENOUS CRCJ: CPT | Performed by: SURGERY

## 2023-04-10 PROCEDURE — D9220A PRA ANESTHESIA: ICD-10-PCS | Mod: CRNA,,, | Performed by: NURSE ANESTHETIST, CERTIFIED REGISTERED

## 2023-04-10 PROCEDURE — C1788 PORT, INDWELLING, IMP: HCPCS | Performed by: SURGERY

## 2023-04-10 PROCEDURE — D9220A PRA ANESTHESIA: Mod: CRNA,,, | Performed by: NURSE ANESTHETIST, CERTIFIED REGISTERED

## 2023-04-10 PROCEDURE — 81025 URINE PREGNANCY TEST: CPT | Performed by: SURGERY

## 2023-04-10 PROCEDURE — 63600175 PHARM REV CODE 636 W HCPCS: Performed by: SURGERY

## 2023-04-10 PROCEDURE — 71000015 HC POSTOP RECOV 1ST HR: Performed by: SURGERY

## 2023-04-10 DEVICE — PORT POWER SLIM: Type: IMPLANTABLE DEVICE | Site: CHEST | Status: FUNCTIONAL

## 2023-04-10 RX ORDER — TRAMADOL HYDROCHLORIDE 50 MG/1
50 TABLET ORAL EVERY 4 HOURS PRN
Status: DISCONTINUED | OUTPATIENT
Start: 2023-04-10 | End: 2023-04-10 | Stop reason: HOSPADM

## 2023-04-10 RX ORDER — DEXAMETHASONE SODIUM PHOSPHATE 4 MG/ML
INJECTION, SOLUTION INTRA-ARTICULAR; INTRALESIONAL; INTRAMUSCULAR; INTRAVENOUS; SOFT TISSUE
Status: DISCONTINUED | OUTPATIENT
Start: 2023-04-10 | End: 2023-04-10

## 2023-04-10 RX ORDER — BUPIVACAINE HYDROCHLORIDE 5 MG/ML
INJECTION, SOLUTION EPIDURAL; INTRACAUDAL
Status: DISCONTINUED
Start: 2023-04-10 | End: 2023-04-10 | Stop reason: HOSPADM

## 2023-04-10 RX ORDER — HEPARIN SODIUM 5000 [USP'U]/ML
INJECTION, SOLUTION INTRAVENOUS; SUBCUTANEOUS
Status: DISCONTINUED
Start: 2023-04-10 | End: 2023-04-10 | Stop reason: HOSPADM

## 2023-04-10 RX ORDER — CEFAZOLIN SODIUM 1 G/3ML
INJECTION, POWDER, FOR SOLUTION INTRAMUSCULAR; INTRAVENOUS
Status: DISCONTINUED | OUTPATIENT
Start: 2023-04-10 | End: 2023-04-10

## 2023-04-10 RX ORDER — MIDAZOLAM HYDROCHLORIDE 1 MG/ML
2 INJECTION INTRAMUSCULAR; INTRAVENOUS ONCE AS NEEDED
Status: COMPLETED | OUTPATIENT
Start: 2023-04-10 | End: 2023-04-10

## 2023-04-10 RX ORDER — ONDANSETRON HYDROCHLORIDE 2 MG/ML
INJECTION, SOLUTION INTRAMUSCULAR; INTRAVENOUS
Status: DISCONTINUED | OUTPATIENT
Start: 2023-04-10 | End: 2023-04-10

## 2023-04-10 RX ORDER — ONDANSETRON 2 MG/ML
4 INJECTION INTRAMUSCULAR; INTRAVENOUS EVERY 12 HOURS PRN
Status: CANCELLED | OUTPATIENT
Start: 2023-04-10

## 2023-04-10 RX ORDER — CEFAZOLIN SODIUM 2 G/50ML
2 SOLUTION INTRAVENOUS
Status: DISCONTINUED | OUTPATIENT
Start: 2023-04-10 | End: 2023-04-10 | Stop reason: HOSPADM

## 2023-04-10 RX ORDER — PROPOFOL 10 MG/ML
VIAL (ML) INTRAVENOUS
Status: DISCONTINUED | OUTPATIENT
Start: 2023-04-10 | End: 2023-04-10

## 2023-04-10 RX ORDER — MEPERIDINE HYDROCHLORIDE 25 MG/ML
12.5 INJECTION INTRAMUSCULAR; INTRAVENOUS; SUBCUTANEOUS ONCE
Status: DISCONTINUED | OUTPATIENT
Start: 2023-04-10 | End: 2023-04-10 | Stop reason: HOSPADM

## 2023-04-10 RX ORDER — ONDANSETRON 2 MG/ML
4 INJECTION INTRAMUSCULAR; INTRAVENOUS ONCE
Status: DISCONTINUED | OUTPATIENT
Start: 2023-04-10 | End: 2023-04-10 | Stop reason: HOSPADM

## 2023-04-10 RX ORDER — METHOCARBAMOL 100 MG/ML
1000 INJECTION, SOLUTION INTRAMUSCULAR; INTRAVENOUS ONCE
Status: DISCONTINUED | OUTPATIENT
Start: 2023-04-10 | End: 2023-04-10 | Stop reason: HOSPADM

## 2023-04-10 RX ORDER — LABETALOL HYDROCHLORIDE 5 MG/ML
10 INJECTION, SOLUTION INTRAVENOUS ONCE
Status: COMPLETED | OUTPATIENT
Start: 2023-04-10 | End: 2023-04-10

## 2023-04-10 RX ORDER — LIDOCAINE HYDROCHLORIDE 10 MG/ML
INJECTION INFILTRATION; PERINEURAL
Status: DISCONTINUED
Start: 2023-04-10 | End: 2023-04-10 | Stop reason: WASHOUT

## 2023-04-10 RX ORDER — SODIUM CHLORIDE, SODIUM LACTATE, POTASSIUM CHLORIDE, CALCIUM CHLORIDE 600; 310; 30; 20 MG/100ML; MG/100ML; MG/100ML; MG/100ML
INJECTION, SOLUTION INTRAVENOUS CONTINUOUS
Status: DISCONTINUED | OUTPATIENT
Start: 2023-04-10 | End: 2023-04-10 | Stop reason: HOSPADM

## 2023-04-10 RX ORDER — BUPIVACAINE HYDROCHLORIDE 5 MG/ML
INJECTION, SOLUTION EPIDURAL; INTRACAUDAL
Status: DISCONTINUED | OUTPATIENT
Start: 2023-04-10 | End: 2023-04-10 | Stop reason: HOSPADM

## 2023-04-10 RX ORDER — FENTANYL CITRATE 50 UG/ML
INJECTION, SOLUTION INTRAMUSCULAR; INTRAVENOUS
Status: DISCONTINUED | OUTPATIENT
Start: 2023-04-10 | End: 2023-04-10

## 2023-04-10 RX ORDER — LABETALOL HYDROCHLORIDE 5 MG/ML
INJECTION, SOLUTION INTRAVENOUS
Status: DISCONTINUED
Start: 2023-04-10 | End: 2023-04-10 | Stop reason: HOSPADM

## 2023-04-10 RX ORDER — MIDAZOLAM HYDROCHLORIDE 1 MG/ML
INJECTION INTRAMUSCULAR; INTRAVENOUS
Status: DISCONTINUED
Start: 2023-04-10 | End: 2023-04-10 | Stop reason: HOSPADM

## 2023-04-10 RX ORDER — CEFAZOLIN 2 G/1
INJECTION, POWDER, FOR SOLUTION INTRAMUSCULAR; INTRAVENOUS
Status: DISCONTINUED
Start: 2023-04-10 | End: 2023-04-10 | Stop reason: HOSPADM

## 2023-04-10 RX ORDER — HYDROMORPHONE HYDROCHLORIDE 2 MG/ML
0.4 INJECTION, SOLUTION INTRAMUSCULAR; INTRAVENOUS; SUBCUTANEOUS EVERY 5 MIN PRN
Status: DISCONTINUED | OUTPATIENT
Start: 2023-04-10 | End: 2023-04-10 | Stop reason: HOSPADM

## 2023-04-10 RX ORDER — LIDOCAINE HYDROCHLORIDE 10 MG/ML
INJECTION, SOLUTION EPIDURAL; INFILTRATION; INTRACAUDAL; PERINEURAL
Status: DISCONTINUED | OUTPATIENT
Start: 2023-04-10 | End: 2023-04-10

## 2023-04-10 RX ORDER — HEPARIN SODIUM 5000 [USP'U]/ML
INJECTION, SOLUTION INTRAVENOUS; SUBCUTANEOUS
Status: DISCONTINUED | OUTPATIENT
Start: 2023-04-10 | End: 2023-04-10 | Stop reason: HOSPADM

## 2023-04-10 RX ADMIN — DEXAMETHASONE SODIUM PHOSPHATE 4 MG: 4 INJECTION, SOLUTION INTRA-ARTICULAR; INTRALESIONAL; INTRAMUSCULAR; INTRAVENOUS; SOFT TISSUE at 07:04

## 2023-04-10 RX ADMIN — LIDOCAINE HYDROCHLORIDE 50 MG: 10 INJECTION, SOLUTION EPIDURAL; INFILTRATION; INTRACAUDAL; PERINEURAL at 07:04

## 2023-04-10 RX ADMIN — MIDAZOLAM HYDROCHLORIDE 2 MG: 1 INJECTION, SOLUTION INTRAMUSCULAR; INTRAVENOUS at 07:04

## 2023-04-10 RX ADMIN — SODIUM CHLORIDE, POTASSIUM CHLORIDE, SODIUM LACTATE AND CALCIUM CHLORIDE: 600; 310; 30; 20 INJECTION, SOLUTION INTRAVENOUS at 07:04

## 2023-04-10 RX ADMIN — TRAMADOL HYDROCHLORIDE 50 MG: 50 TABLET, COATED ORAL at 09:04

## 2023-04-10 RX ADMIN — PROPOFOL 175 MG: 10 INJECTION, EMULSION INTRAVENOUS at 07:04

## 2023-04-10 RX ADMIN — CEFAZOLIN 2 G: 330 INJECTION, POWDER, FOR SOLUTION INTRAMUSCULAR; INTRAVENOUS at 07:04

## 2023-04-10 RX ADMIN — LABETALOL HYDROCHLORIDE 10 MG: 5 INJECTION, SOLUTION INTRAVENOUS at 08:04

## 2023-04-10 RX ADMIN — ONDANSETRON 4 MG: 2 INJECTION INTRAMUSCULAR; INTRAVENOUS at 07:04

## 2023-04-10 RX ADMIN — FENTANYL CITRATE 50 MCG: 50 INJECTION, SOLUTION INTRAMUSCULAR; INTRAVENOUS at 07:04

## 2023-04-10 RX ADMIN — SODIUM CHLORIDE, POTASSIUM CHLORIDE, SODIUM LACTATE AND CALCIUM CHLORIDE: 600; 310; 30; 20 INJECTION, SOLUTION INTRAVENOUS at 06:04

## 2023-04-10 NOTE — ANESTHESIA POSTPROCEDURE EVALUATION
Anesthesia Post Evaluation    Patient: Lesley Estrada    Procedure(s) Performed: Procedure(s) (LRB):  Placement, Mediport (N/A)    Final Anesthesia Type: general      Patient location during evaluation: PACU  Patient participation: Yes- Able to Participate  Level of consciousness: awake  Post-procedure vital signs: reviewed and stable  Pain management: adequate  Airway patency: patent    PONV status at discharge: vomiting (controlled) and nausea (controlled)  Anesthetic complications: no      Cardiovascular status: hemodynamically stable  Respiratory status: spontaneous ventilation and unassisted  Hydration status: euvolemic  Follow-up not needed.  Comments:              Vitals Value Taken Time   /86 04/10/23 0904   Temp 36.2 °C (97.2 °F) 04/10/23 0800   Pulse 72 04/10/23 0904   Resp 16 04/10/23 0907   SpO2 100 % 04/10/23 0900         Event Time   Out of Recovery 08:29:00         Pain/Leeann Score: Pain Rating Prior to Med Admin: 3 (4/10/2023  9:07 AM)  Leeann Score: 10 (4/10/2023  8:30 AM)

## 2023-04-10 NOTE — PROGRESS NOTES
Dr. Hughes's office called regarding referral for Mediport placement. Patient denied the appointment that was offer, stated that she has appointment elsewhere.

## 2023-04-10 NOTE — BRIEF OP NOTE
[facility]  Brief Operative Note     SUMMARY     Surgery Date: 4/10/2023     Surgeon: Alexus Pollack MD    Assist: Agustina Lorenzana PA-C    Pre-op Diagnosis:  Malignant neoplasm of overlapping sites of cervix [C53.8]  Venous insufficiency [I87.2]    Post-op Diagnosis:  Post-Op Diagnosis Codes:     * Malignant neoplasm of overlapping sites of cervix [C53.8]     * Venous insufficiency [I87.2]    Procedure(s) (LRB):  Placement, Mediport (N/A)    Anesthesia: General/MAC    Findings/Key Components: Placement of left subclavian vein Mediport for chemotherapy    Estimated Blood Loss: 2 ml         Specimens:   Specimen (24h ago, onward)      None            Discharge Note    SUMMARY     Admit Date: 4/10/2023    Discharge Date and Time:  04/10/2023 7:52 AM    Hospital Course (synopsis of major diagnoses, care, treatment, and services provided during the course of the hospital stay): She is status post left subclavian vein Mediport placement    Final Diagnosis: Post-Op Diagnosis Codes:     * Malignant neoplasm of overlapping sites of cervix [C53.8]     * Venous insufficiency [I87.2]    Disposition: Home or Self Care    Follow Up/Patient Instructions:    Follow-up Information       SAIRA Jones Follow up.    Specialty: Hematology and Oncology  Why: 4/12/2023 11:30 AM  Contact information:  [place] [id number]  [phone]                             Medications:  Reconciled Home Medications:      Medication List        CONTINUE taking these medications      acetaminophen 500 MG tablet  Commonly known as: TYLENOL  Take 1,000 mg by mouth every 6 (six) hours as needed for Pain.     amLODIPine 5 MG tablet  Commonly known as: NORVASC     LORazepam 1 MG tablet  Commonly known as: ATIVAN  As needed     OLANZapine 5 MG tablet  Commonly known as: ZyPREXA  Take 1 tablet (5 mg total) by mouth nightly.     ondansetron 8 MG tablet  Commonly known as: ZOFRAN  Take 1 tablet (8 mg total)  by mouth every 8 (eight) hours as needed for Nausea.     polyethylene glycol 17 gram Pwpk  Commonly known as: GLYCOLAX  Take 17 g by mouth once daily.     prochlorperazine 10 MG tablet  Commonly known as: COMPAZINE  Take 1 tablet (10 mg total) by mouth every 6 (six) hours as needed (nausea).     zolpidem 5 MG Tab  Commonly known as: AMBIEN            Discharge Procedure Orders   Diet general     Ice to affected area     Lifting restrictions     Remove dressing in 24 hours   Order Comments: Leave glue and steri strips until clinic visit     Call MD for:  temperature >100.4     Call MD for:  persistent nausea and vomiting     Call MD for:  severe uncontrolled pain     Call MD for:  difficulty breathing, headache or visual disturbances     Call MD for:  redness, tenderness, or signs of infection (pain, swelling, redness, odor or green/yellow discharge around incision site)     Call MD for:  hives     Call MD for:  persistent dizziness or light-headedness      Follow-up Information       SAIRA Jones Follow up.    Specialty: Hematology and Oncology  Why: 4/12/2023 11:30 AM  Contact information:  [place] [id number]  [phone]

## 2023-04-10 NOTE — INTERVAL H&P NOTE
zThe patient has been examined and the H&P has been reviewed:    I concur with the findings and no changes have occurred since H&P was written.    Surgery risks, benefits and alternative options discussed and understood by patient/family.        All of questions were answered    Alexus Pollack MD  Breast Surgical Oncology

## 2023-04-10 NOTE — PLAN OF CARE
VSS, janine score  10 , pt arousable and ready for transfer to [facility] per Dr Dos Santos.

## 2023-04-10 NOTE — OP NOTE
DATE OF PROCEDURE: 4/10/2023    SURGEON: Alexus Pollack M.D.    ASSISTANT:  Agustina Lorenzana PA-C    PREOPERATIVE DIAGNOSIS: Malignant neoplasm of overlapping sites of cervix [C53.8]  Venous insufficiency [I87.2]     POSTOPERATIVE DIAGNOSIS: Post-Op Diagnosis Codes:     * Malignant neoplasm of overlapping sites of cervix [C53.8]     * Venous insufficiency [I87.2]     ANESTHESIA: General/MAC Anesthesiologist: Milan Dos Santos MD  CRNA: Chivo Duong CRNA     PROCEDURES PERFORMED:   1.  Left subclavian vein Mediport Placement under fluoroscopic guidance  Placement, Mediport:        PROCEDURE IN DETAIL:   She is in need of adjuvant chemotherapy and has venous insufficiency. I explained to her that this would be an outpatient procedure with local anesthesia and sedation. The port would be placed under her skin with a catheter which runs into the major vein in her neck or chest. I explained to her my initial access site would be the subclavian vein beneath the clavicle on the left side. If not successful, then the second option would be to do another incision in the neck to access the internal jugular vein. The risks and complications of pain, bleeding, infection, blood clot, scarring, the inability to access the port, clogging of the port, and pneumo/hemothorax with possible chest tube or additional surgery were all explained in detail. Informed consent was obtained.    The patient was then brought to the operating room and placed supine on the operative table. Sedation anesthesia was initiated. The skin of the Left and Right neck, chest, and shoulder were prepped and draped in standard sterile surgical fashion. A time-out was completed verifying correct patient, procedure, site, positioning and equipment prior to beginning the procedure.     The Mediport placement  The skin of the Left and Right neck, chest, and shoulder were prepped and draped in standard sterile surgical fashion. An incision was planned of the  left infraclavicular area about 2 fingerbreadths below the clavicle. The incision was made with a 15-blade. The subcutaneous tissue was dissected using electrocautery. Hemostasis was checked and maintained. Using Seldinger technique the left subclavian vein was accessed. A guide-wire was then introduced. Using the C-Arm the positioning of the wire was checked and determine to be in the appropriate location. A sheath was then inserted over the guide-wire and the inner cannula and guide-wire were both removed. The catheter which had already been flushed and checked was then inserted into the subclavian vein. The C-Arm was then used to confirm and adjust the catheter so that it was positioned in the superior vena cava. The length of the catheter was measure at 25 cm from the port connection point.    A subcutaneous pocket was then created for the Mediport. The catheter was cut and attached to the Mediport. The port was flushed and checked with Heparinized solution. The port was placed into the pocket. The Mediport was checked and flushed with heparinized solution. The subcutaneous tissues were closed with 3-0 Monocryl and the skin closed with running 4-0 Monocryl subcuticular stitches. Dermabond was applied followed by sterile dressings.         Significant Surgical Tasks Conducted by the Assistant(s), if Applicable: The skilled assistance of the Physician Assistant, Agustina Lorenzana PA-C, was necessary for the successful completion of this case. She was essential for proper positioning of the patient, manipulation of instruments, proper exposure, manipulation of tissue, and wound closure.       ESTIMATED BLOOD LOSS: 2 ml    Implants:   Implant Name Type Inv. Item Serial No.  Lot No. LRB No. Used Action   PORT POWER SLIM - [id number]  PORT POWER SLIM  [employer] [id number] Left 1 Implanted       Specimens:   Specimen (24h ago, onward)      None                    Condition: Good    Disposition: PACU -  hemodynamically stable.    Attestation: I performed the procedure.

## 2023-04-10 NOTE — ANESTHESIA PREPROCEDURE EVALUATION
"                                                                                                             04/10/2023  Lesley Estrada is a 47 y.o., female   Pre-operative evaluation for Procedure(s) (LRB):  Placement, Mediport (N/A)    BP (!) 147/89   Pulse 77   Ht 5' 5" (1.651 m)   Wt 98.5 kg (217 lb 2.5 oz)   LMP 04/03/2023 (Exact Date)   SpO2 97%   Breastfeeding No   BMI 36.14 kg/m²     Past Medical History:   Diagnosis Date    ADD (attention deficit disorder)     Cancer 2023    Breast Cancer    Essential (primary) hypertension     Generalized anxiety disorder        Patient Active Problem List   Diagnosis    Malignant neoplasm of overlapping sites of cervix    Encounter for chemotherapy management       Review of patient's allergies indicates:  No Known Allergies    Current Outpatient Medications   Medication Instructions    acetaminophen (TYLENOL) 1,000 mg, Oral, Every 6 hours PRN    amLODIPine (NORVASC) 5 MG tablet No dose, route, or frequency recorded.    LORazepam (ATIVAN) 1 MG tablet As needed    OLANZapine (ZYPREXA) 5 mg, Oral, Nightly    ondansetron (ZOFRAN) 8 mg, Oral, Every 8 hours PRN    polyethylene glycol (GLYCOLAX) 17 g, Oral, Daily    prochlorperazine (COMPAZINE) 10 mg, Oral, Every 6 hours PRN    zolpidem (AMBIEN) 5 MG Tab No dose, route, or frequency recorded.       Past Surgical History:   Procedure Laterality Date    Arm surgery Left     HAD STAPH INFECTION  IN ARM AND WAS DRAINED         Lab Results   Component Value Date    WBC 8.8 04/04/2023    HGB 14.0 04/04/2023    HCT 41.3 04/04/2023    MCV 92.2 04/04/2023     04/04/2023          BMP  Lab Results   Component Value Date     04/04/2023    K 3.9 04/04/2023    CHLORIDE 105 04/04/2023    CO2 24 04/04/2023    GLUCOSE 103 (H) 04/04/2023    BUN 10.0 04/04/2023    CREATININE 0.71 04/04/2023    CALCIUM 9.6 04/04/2023        INR  No results for input(s): PT, INR, PROTIME, APTT in the last 72 " hours.        Diagnostic Studies:      EKG:  Results for orders placed or performed in visit on 03/06/23   EKG 12-lead    Collection Time: 03/06/23  3:12 PM    Narrative    Test Reason : Z01.818,    Vent. Rate : 095 BPM     Atrial Rate : 095 BPM     P-R Int : 144 ms          QRS Dur : 070 ms      QT Int : 348 ms       P-R-T Axes : -02 028 057 degrees     QTc Int : 437 ms    Normal sinus rhythm  Nonspecific T wave abnormality  Abnormal ECG  No previous ECGs available  Confirmed by Jose Luis Briceno MD ([id number]) on 3/7/2023 5:37:46 AM    Referred By: AIDEE MERA           Confirmed By:Jose Luis Briceno MD       No results found for this or any previous visit.        .      Pre-op Assessment          Review of Systems      Physical Exam  General: Alert and Oriented    Airway:  Mallampati: II   Mouth Opening: Normal  TM Distance: Normal  Tongue: Normal  Neck ROM: Normal ROM    Dental:  Intact    Chest/Lungs:  Clear to auscultation, Normal Respiratory Rate    Heart:  Rate: Normal  Rhythm: Regular Rhythm        Anesthesia Plan  Type of Anesthesia, risks & benefits discussed:    Anesthesia Type: Gen Supraglottic Airway  Intra-op Monitoring Plan: Standard ASA Monitors  Post Op Pain Control Plan: multimodal analgesia  Induction:  IV and Inhalation  Airway Plan: Direct  Informed Consent: Informed consent signed with the Patient and all parties understand the risks and agree with anesthesia plan.  All questions answered.   ASA Score: 3  Day of Surgery Review of History & Physical: H&P Update referred to the surgeon/provider.  Anesthesia Plan Notes: Disc possible need to convert to GETA    Discussed Anesthetic Plan w/ Pt/Family. Questions Entertained. Accepted.      Ready For Surgery From Anesthesia Perspective.     .

## 2023-04-10 NOTE — PLAN OF CARE
0935 pt has met all dc criteria, no complaints or concerns, states ready to go for radiation appointment.

## 2023-04-10 NOTE — DISCHARGE INSTRUCTIONS
Patient Education        How to Care for a Portacath   About this topic   A central line catheter is a very long intravenous (IV) line. A portacath or port is a type of central line. A port has 2 main parts, a round disc and a long tube called a catheter. The port and catheter are completely inside your body. The port looks like a small bump under your skin. It is most often in the chest area, but may be in your upper arm or lower abdomen. The long catheter goes under the skin and into a large vein that leads just above your heart.  Some ports can be used to inject dye when you have special tests. This is a power-injectable port. Ask your doctor about your port before you have any tests. Also, your doctor will give you a card that tells information about your port. Keep this with you and share it with your other doctors.  Once this port is in place, the doctor or nurse will use a special needle to get into the disc part. This is called accessing the port. The needle can stay in for a week at a time and will be covered with a clear bandage and taped in place. When the needle is not in the port, you will not have any holes in your skin because the port is under the skin. It can stay in place for months or years until your treatment is no longer needed. This kind of IV is used to draw blood for tests whenever possible to save your other veins from needlesticks.  General   Always wash your hands carefully before you touch your port, needle, tubing, or dressing. Everyone who touches your port or access needle should wash their hands first and wear gloves.       When you are at home, there may be times when your port is still accessed. This means it still has a needle in place.  Do not get your port wet while the needle is in place. Take a sponge bath or cover the area with plastic wrap before you shower.  Make sure your dressing stays clean and dry. This will help keep the needle secure and in place.  If your port is  not accessed, it will just look like a small raised area under the skin.  You can shower, take a bath, or swim in a pool.  You do not need to have a dressing over your port.  Wear clothes that do not rub on your port.  Talk to your doctor about what activities are safe for you.  Flush your port before and after drugs and blood draws. Flush your port at least one time every 4 weeks. Talk to your doctor about what you should use to flush your port with.  Changing the Port Dressing: Keeping the skin clean and changing the dressing as ordered may help to prevent infection. You will always change the dressing if it is wet, loose, or dirty. You or someone in your family can learn how to do this. Others have a nurse come to your home to help with this care.  Gather supplies and place them on a clean work space. Your supplies may come in a kit. You will need clean gloves, sterile gloves, masks, chlorhexidine swab sticks, skin preparation wipes, tape, and sterile dressings.  Always wash your hands well with soap and water before touching the needle and tubing. This will help to avoid spreading germs. Wear a mask when changing the dressing. Also, have the patient wear a mask or keep their head turned away while the dressing is being changed. Wear clean gloves when taking off the old dressing.  Take off the old dressing. Avoid using scissors or sharp tools. They could cut the extension tubing that is connected to the needle. Do not pull on the tubing when taking off the dressing.  Check the site for swelling, drainage, or redness.  Wash your hands again. Put on sterile gloves.  Wash the site with a chlorhexidine swab using a back-and-forth scrubbing motion for 30 seconds. Start at the exit site, wash the tubing if it is staying in, and wash away from the site. Clean the entire area where the new dressing will go, about 4 inches (10 cm). Let the cleaned area air dry for at least 30 seconds or until completely dry. Do not fan,  wave, or blow on the site to help it dry faster.  Your doctor may order a small sponge to go around the needle where it goes into the port. Put this sponge on the skin so it circles the needle. Make sure it is right-side up as marked on the sponge.  Once the cleaned area is dry, use a skin preparation wipe around the port where the new dressing will go. Allow this skin prep to dry completely before putting the dressing on. This protects the skin, helping to prevent irritation from changing the dressing and helps the dressing stick.  Lay the tubing on the skin in a loose [place] if it is long or in a straight line if it is short. It should not lay on top of where the port is in the skin or lay on itself. Put the dressing on all sides of the tubing. Be sure to cover where the needle goes in to the skin. The dressing should be snug but not stretched or it will pull on the skin. Most dressings are clear and will stick to the skin. If it is not sticking, put medical tape on the edges only to hold it in place. If you often have trouble with it sticking, talk to your doctor or nurse about the dressing you use or the way you are changing your dressing. They will help you get the dressing to stick better.  Throw away the old dressing, mask, used swabs, and used wipes.  Remove gloves.  Wash your hands with soap and water.  Accessing and Flushing the Port: The port should be flushed each month with a sterile fluid. It should also be flushed before and after each use. This will help keep the port and catheter from getting blocked.  Wash your hands with soap and water.  Gather supplies and place them on a clean work space. You will need:  Clean gloves  Masks for the person who is accessing the port and one for the patient  Chlorhexidine swab sticks  Alcohol wipes  Special needle to access the port connected to tubing. This needle is bent in the middle and connected to a short piece of clear tubing.  10 mL syringes filled with the  fluid to flush the catheter. Your doctor may order heparin, normal saline, or a special flush for you to use. Your doctor may have you use a smaller amount of fluid to flush the line if the catheter is in a child.  Injection cap  Numbing cream is sometimes used to numb the skin before accessing the port.  Wear a mask when changing the dressing. Also, have the patient wear a mask or keep their head turned away while the dressing is being changed.  Put on clean gloves and take off the old dressing. Throw it away.  Wash your hands with soap and water.  Apply numbing cream on skin over port and wait required time for cream to work.  Wipe off cream with clean gauze.  Now put on sterile gloves. Connect the injection cap to the tubing. Fill injection cap, tubing, and needle with flush solution. Clamp tubing and leave the syringe attached to the tubing.  Clean skin over port with chlorhexidine swabs and allow to dry. Do not fan, wave, or blow on the site to help it dry faster.  Hold the edges of the port in place with your thumb and index finger of the hand that you do not write with. Do not touch the place where you will insert the needle because it is clean.  Insert the needle at a 90-degree angle from the skin in the center of the port. Push the needle in firmly until it touches the back of the port. Do not push more once you reach the back of the port.  Unclamp the tubing and pull back on the syringe to check for a blood return. You do not need to fill the syringe with blood, just enough into the tubing to check that the blood is there. If there is a blood return, flush slowly and steadily until the syringe is almost empty. Clamp the tubing before you get to the very last amount of flush solution, less than 1 mL in the syringe.  If there is not a blood return, reposition the needle and try again. If there is still no blood return, start over with a new needle. If you are not able to get a blood return with a new needle,  contact your doctor.  Take off syringe.  Cover needle and tubing with a clear dressing.  Throw out any used swabs, wipes, or materials.  Wash your hands with soap and water.  Flushing the Port and Taking Out the Needle (Deaccessing)   Wash your hands with soap and water.  Gather supplies and place them on a clean work space. You will need:  Masks for the person who is accessing the port and one for the patient  Clean gloves  Alcohol wipe  10 mL syringes filled with the fluid to flush the catheter. Your doctor may order heparin, normal saline, or a special flush solution for you to use. Your doctor may have you use a smaller amount of fluid to flush the line if the catheter is in a child.  Wash your hands with soap and water.  Wearing clean gloves, scrub the injection cap with an alcohol wipe for 15 seconds and let dry. Do not fan, wave, or blow on the site to help it dry faster.  Attach the flush syringe to the injection cap. Unclamp the tubing and pull back on the syringe to check for a blood return. If there is a blood return, flush slowly and steadily until the syringe is almost empty. Clamp the tubing before you get to the very last bit of flush solution, less than 1 mL, and remove the syringe.  If there is not a blood return, reposition the needle and try again. If there is still no blood return, start over with a new needle. If you are not able to get a blood return with a new needle, contact your doctor.  After flushing the port, put on the mask and remove the clear dressing. Hold the port steady with your thumb and index fingers of one hand. With your other hand, remove the port needle. Take care that you do not stick your fingers or skin with the needle. Most needles have a safety catch or cover. Your nurse will show you how to cover the needle when it is out.  Dispose of the needle in the proper container.  If there is bleeding, put gentle pressure on the site with a sterile gauze until it stops.  Throw  out your mask, used swabs, wipes, or materials.  Wash your hands with soap and water.  What problems could happen?   Infection  Bruising  Blood clot  Scar inside the large blood vessel if the port is in for a long time  Catheter moves to wrong position inside the vein  Fluid leaks into the surrounding skin and tissue  When do I need to call the doctor?   Fever of 100.4°F (38°C) or higher  Problems with your port site like:  Site starts bleeding and does not stop with gentle pressure  Site becomes more red, or the skin over or around your port breaks open  Rash, bumps, itching, or irritation where the dressing goes onto the skin  Drainage or pus from the port site  Problems with the port like:  Your port seems to have moved under your skin  Not able to get the needle into the port  Pain, irritation, or swelling when you flush the port  Not able to get the drugs or flush solution through the port  Not able to get a blood return from your port  You have any concerns about your port  Sudden shortness of breath or chest pain  Pain in your arm or chest when you flush the port  Swishing sound in one of your ears when you flush the port  Swelling in your neck, jaw, face, or upper chest  Your veins in your face, upper arms, neck, or chest are swelling up or sticking out  Change in skin color on your upper chest, neck, jaw, or face  Teach Back: Helping You Understand   The Teach Back Method helps you understand the information we are giving you. After you talk with the staff, tell them in your own words what you learned. This helps to make sure the staff has described each thing clearly. It also helps to explain things that may have been confusing. Before going home, make sure you can do these:  I can tell you about my condition.  I can tell you how I will take care of my port.  I can tell you what I will do if I have fever, redness, or drainage from the port site.  Where can I learn more?   American Society of Clinical  Oncology  https://www.cancer.net/navigating-cancer-care/how-cancer-treated/chemotherapy/catheters-and-ports-cancer-treatment   [facility] Cancer Support  https://www.[facility].org.uk/information-and-support/treating/chemotherapy/being-treated-with-chemotherapy/implantable-ports.html#20639   Last Reviewed Date   2021-09-27  Consumer Information Use and Disclaimer   This information is not specific medical advice and does not replace information you receive from your health care provider. This is only a brief summary of general information. It does NOT include all information about conditions, illnesses, injuries, tests, procedures, treatments, therapies, discharge instructions or life-style choices that may apply to you. You must talk with your health care provider for complete information about your health and treatment options. This information should not be used to decide whether or not to accept your health care providers advice, instructions or recommendations. Only your health care provider has the knowledge and training to provide advice that is right for you.  Copyright   Copyright © 2021 [employer] and its affiliates and/or licensors. All rights reserved.

## 2023-04-11 ENCOUNTER — LAB VISIT (OUTPATIENT)
Dept: LAB | Facility: HOSPITAL | Age: 48
End: 2023-04-11
Payer: MEDICAID

## 2023-04-11 DIAGNOSIS — Z71.9 ENCOUNTER FOR EDUCATION: ICD-10-CM

## 2023-04-11 DIAGNOSIS — C53.9 MALIGNANT NEOPLASM OF CERVIX, UNSPECIFIED SITE: ICD-10-CM

## 2023-04-11 DIAGNOSIS — Z51.11 ENCOUNTER FOR CHEMOTHERAPY MANAGEMENT: ICD-10-CM

## 2023-04-11 DIAGNOSIS — C53.9 MALIGNANT NEOPLASM OF CERVIX, UNSPECIFIED SITE: Primary | ICD-10-CM

## 2023-04-11 DIAGNOSIS — C53.8 MALIGNANT NEOPLASM OF OVERLAPPING SITES OF CERVIX: ICD-10-CM

## 2023-04-11 LAB
ALBUMIN SERPL-MCNC: 4.2 G/DL (ref 3.5–5)
ALBUMIN/GLOB SERPL: 1.3 RATIO (ref 1.1–2)
ALP SERPL-CCNC: 66 UNIT/L (ref 40–150)
ALT SERPL-CCNC: 30 UNIT/L (ref 0–55)
AST SERPL-CCNC: 19 UNIT/L (ref 5–34)
BASOPHILS # BLD AUTO: 0.07 X10(3)/MCL (ref 0–0.2)
BASOPHILS NFR BLD AUTO: 0.8 %
BILIRUBIN DIRECT+TOT PNL SERPL-MCNC: 0.3 MG/DL
BUN SERPL-MCNC: 11 MG/DL (ref 7–18.7)
CALCIUM SERPL-MCNC: 9.9 MG/DL (ref 8.4–10.2)
CHLORIDE SERPL-SCNC: 103 MMOL/L (ref 98–107)
CO2 SERPL-SCNC: 24 MMOL/L (ref 22–29)
CREAT SERPL-MCNC: 0.72 MG/DL (ref 0.55–1.02)
EOSINOPHIL # BLD AUTO: 0.09 X10(3)/MCL (ref 0–0.9)
EOSINOPHIL NFR BLD AUTO: 1 %
ERYTHROCYTE [DISTWIDTH] IN BLOOD BY AUTOMATED COUNT: 11.9 % (ref 11.5–17)
GFR SERPLBLD CREATININE-BSD FMLA CKD-EPI: >60 MLS/MIN/1.73/M2
GLOBULIN SER-MCNC: 3.3 GM/DL (ref 2.4–3.5)
GLUCOSE SERPL-MCNC: 137 MG/DL (ref 74–100)
HCT VFR BLD AUTO: 40 % (ref 37–47)
HGB BLD-MCNC: 13.2 G/DL (ref 12–16)
IMM GRANULOCYTES # BLD AUTO: 0.1 X10(3)/MCL (ref 0–0.04)
IMM GRANULOCYTES NFR BLD AUTO: 1.1 %
LYMPHOCYTES # BLD AUTO: 1.47 X10(3)/MCL (ref 0.6–4.6)
LYMPHOCYTES NFR BLD AUTO: 16.1 %
MAGNESIUM SERPL-MCNC: 2.1 MG/DL (ref 1.6–2.6)
MCH RBC QN AUTO: 30.8 PG (ref 27–31)
MCHC RBC AUTO-ENTMCNC: 33 G/DL (ref 33–36)
MCV RBC AUTO: 93.2 FL (ref 80–94)
MONOCYTES # BLD AUTO: 0.66 X10(3)/MCL (ref 0.1–1.3)
MONOCYTES NFR BLD AUTO: 7.2 %
NEUTROPHILS # BLD AUTO: 6.76 X10(3)/MCL (ref 2.1–9.2)
NEUTROPHILS NFR BLD AUTO: 73.8 %
PHOSPHATE SERPL-MCNC: 3 MG/DL (ref 2.3–4.7)
PLATELET # BLD AUTO: 388 X10(3)/MCL (ref 130–400)
PMV BLD AUTO: 8.7 FL (ref 7.4–10.4)
POTASSIUM SERPL-SCNC: 3.5 MMOL/L (ref 3.5–5.1)
PROT SERPL-MCNC: 7.5 GM/DL (ref 6.4–8.3)
RBC # BLD AUTO: 4.29 X10(6)/MCL (ref 4.2–5.4)
SODIUM SERPL-SCNC: 139 MMOL/L (ref 136–145)
WBC # SPEC AUTO: 9.2 X10(3)/MCL (ref 4.5–11.5)

## 2023-04-11 PROCEDURE — 80053 COMPREHEN METABOLIC PANEL: CPT

## 2023-04-11 PROCEDURE — 85025 COMPLETE CBC W/AUTO DIFF WBC: CPT

## 2023-04-11 PROCEDURE — 77412 RADIATION TX DELIVERY LVL 3: CPT | Performed by: RADIOLOGY

## 2023-04-11 PROCEDURE — 84100 ASSAY OF PHOSPHORUS: CPT

## 2023-04-11 PROCEDURE — 83735 ASSAY OF MAGNESIUM: CPT

## 2023-04-11 PROCEDURE — 36415 COLL VENOUS BLD VENIPUNCTURE: CPT

## 2023-04-12 ENCOUNTER — DOCUMENTATION ONLY (OUTPATIENT)
Dept: HEMATOLOGY/ONCOLOGY | Facility: CLINIC | Age: 48
End: 2023-04-12

## 2023-04-12 ENCOUNTER — OFFICE VISIT (OUTPATIENT)
Dept: HEMATOLOGY/ONCOLOGY | Facility: CLINIC | Age: 48
End: 2023-04-12
Payer: MEDICAID

## 2023-04-12 VITALS
TEMPERATURE: 98 F | BODY MASS INDEX: 35.37 KG/M2 | WEIGHT: 212.31 LBS | DIASTOLIC BLOOD PRESSURE: 86 MMHG | RESPIRATION RATE: 18 BRPM | OXYGEN SATURATION: 97 % | SYSTOLIC BLOOD PRESSURE: 131 MMHG | HEART RATE: 101 BPM | HEIGHT: 65 IN

## 2023-04-12 DIAGNOSIS — F41.9 ANXIETY: Primary | ICD-10-CM

## 2023-04-12 DIAGNOSIS — K21.9 GASTROESOPHAGEAL REFLUX DISEASE, UNSPECIFIED WHETHER ESOPHAGITIS PRESENT: ICD-10-CM

## 2023-04-12 DIAGNOSIS — Z80.9 FAMILY HISTORY OF CANCER: ICD-10-CM

## 2023-04-12 PROCEDURE — 1159F PR MEDICATION LIST DOCUMENTED IN MEDICAL RECORD: ICD-10-PCS | Mod: CPTII,,,

## 2023-04-12 PROCEDURE — 1160F PR REVIEW ALL MEDS BY PRESCRIBER/CLIN PHARMACIST DOCUMENTED: ICD-10-PCS | Mod: CPTII,,,

## 2023-04-12 PROCEDURE — 99214 OFFICE O/P EST MOD 30 MIN: CPT | Mod: PBBFAC,25

## 2023-04-12 PROCEDURE — 3079F DIAST BP 80-89 MM HG: CPT | Mod: CPTII,,,

## 2023-04-12 PROCEDURE — 99999 PR PBB SHADOW E&M-EST. PATIENT-LVL IV: ICD-10-PCS | Mod: PBBFAC,,,

## 2023-04-12 PROCEDURE — 99215 PR OFFICE/OUTPT VISIT, EST, LEVL V, 40-54 MIN: ICD-10-PCS | Mod: S$PBB,,,

## 2023-04-12 PROCEDURE — 99999 PR PBB SHADOW E&M-EST. PATIENT-LVL IV: CPT | Mod: PBBFAC,,,

## 2023-04-12 PROCEDURE — 1160F RVW MEDS BY RX/DR IN RCRD: CPT | Mod: CPTII,,,

## 2023-04-12 PROCEDURE — 77417 THER RADIOLOGY PORT IMAGE(S): CPT | Performed by: RADIOLOGY

## 2023-04-12 PROCEDURE — 3008F BODY MASS INDEX DOCD: CPT | Mod: CPTII,,,

## 2023-04-12 PROCEDURE — 3075F SYST BP GE 130 - 139MM HG: CPT | Mod: CPTII,,,

## 2023-04-12 PROCEDURE — 77412 RADIATION TX DELIVERY LVL 3: CPT | Performed by: RADIOLOGY

## 2023-04-12 PROCEDURE — 3008F PR BODY MASS INDEX (BMI) DOCUMENTED: ICD-10-PCS | Mod: CPTII,,,

## 2023-04-12 PROCEDURE — 3075F PR MOST RECENT SYSTOLIC BLOOD PRESS GE 130-139MM HG: ICD-10-PCS | Mod: CPTII,,,

## 2023-04-12 PROCEDURE — 99215 OFFICE O/P EST HI 40 MIN: CPT | Mod: S$PBB,,,

## 2023-04-12 PROCEDURE — 1159F MED LIST DOCD IN RCRD: CPT | Mod: CPTII,,,

## 2023-04-12 PROCEDURE — 3079F PR MOST RECENT DIASTOLIC BLOOD PRESSURE 80-89 MM HG: ICD-10-PCS | Mod: CPTII,,,

## 2023-04-12 RX ORDER — SODIUM CHLORIDE 0.9 % (FLUSH) 0.9 %
10 SYRINGE (ML) INJECTION
Status: CANCELLED | OUTPATIENT
Start: 2023-04-13

## 2023-04-12 RX ORDER — PANTOPRAZOLE SODIUM 40 MG/1
40 TABLET, DELAYED RELEASE ORAL DAILY
Qty: 30 TABLET | Refills: 3 | Status: SHIPPED | OUTPATIENT
Start: 2023-04-12 | End: 2024-04-11

## 2023-04-12 RX ORDER — HEPARIN 100 UNIT/ML
500 SYRINGE INTRAVENOUS
Status: CANCELLED | OUTPATIENT
Start: 2023-04-13

## 2023-04-12 RX ORDER — LORAZEPAM 1 MG/1
1 TABLET ORAL 2 TIMES DAILY
Qty: 60 TABLET | Refills: 0 | Status: SHIPPED | OUTPATIENT
Start: 2023-04-12

## 2023-04-12 NOTE — PROGRESS NOTES
Subjective:       Patient ID: Lesley Estrada is a 47 y.o. female.    Chief Complaint: Cervical Cancer (Pt reports having heartburn as well as some diarrhea)    Diagnosis: Cervical Cancer Stage 2B     Current Treatment:   Cisplatin 40 mg q.week x6 cycles, rescanned with PET/CT three-month once completion of 6 cycles/radiation    Treatment History: None      HPI  47 yr old who presents for evaluation of cervical cancer in March 2023. Upon initial diagnosis in January, she had the option of definitive chemo/XRT vs total hysterectomy and decided to proceed with surgery with Dr. Rhodes. Unfortunately at the time of surgery, an exam was done that revealed obvious tumor extension onto the anterior of the vagina with some possible involvement of the left lateral vaginal apex. The tumor itself was thought to be approximately 5cm at the time of bimanual exam on 3/20. Given these new progressive findings it was decided that patient would be a better candidate for curative intent if she underwent concurrent chemo/RT. She has met with Dr. Gandhi and was referred to oncology for discussion of concurrent chemotherapy.     Today patient is accompanied by her two daughters. She has some interrmitent cervical bleeding and pain. Otherwise she has no complaints.     Family history includes a mother who had breast cancer at the age of 57 and her grandmother had ovarian cancer in her 40-50s and then developed breast cancer in her 90s as well as several other maternal family members with different types of malignancies.     I discussed her Diagnosis, prognosis, and recommended treatment options. I included NCCN guideline recommendations. I discussed the general toxicities of chemotherapy as well as specific concerns with cisplatin including ototoxicity, renal dysfunction, and neuropathy. She is willing to proceed.     Interval History:  She returns to the clinic today for a one-week after receiving her 1st cycle of cisplatin and for  treatment clearance for cycle 2 cisplatin scheduled for tomorrow.  Overall, she tolerated chemotherapy well with minimal side effects.  She did have occasional nausea was controlled with current medication regimen.  She did have 1 episode of diarrhea that did not require medication.  She did have her MediPort placed by Dr. Pollack on 04/10/2023 and is healing well.  She is currently on treatment 6 of 25 of radiation.  She reports that she does have increased anxiety.  She was previously on Cymbalta through her PCP which did improve her anxiety.  She was also on Ativan 1 mg through her PCP.  She denies any neuropathy, shortness of breath, chest pain, changes in stool, pain, fatigue, recent hospitalizations or illnesses.      Past Medical History:   Diagnosis Date    ADD (attention deficit disorder)     Cancer 2023    Cervical Cancer    Essential (primary) hypertension     Generalized anxiety disorder       Past Surgical History:   Procedure Laterality Date    Arm surgery Left     HAD STAPH INFECTION  IN ARM AND WAS DRAINED    PLACEMENT, MEDIPORT N/A 4/10/2023    Procedure: Placement, Mediport;  Surgeon: Alexus Pollack MD;  Location: [facility];  Service: General;  Laterality: N/A;     Social History     Socioeconomic History    Marital status:    Tobacco Use    Smoking status: Never    Smokeless tobacco: Never   Substance and Sexual Activity    Alcohol use: Yes     Comment: 4 X WEEK    Drug use: Never    Sexual activity: Yes     Partners: Male      Family History   Problem Relation Age of Onset    Breast cancer Mother 57    Other Father         Had stints in his heart    Ovarian cancer Maternal Grandmother         50s    Breast cancer Maternal Grandmother         late 80s    Stomach cancer Maternal Grandfather         late 60s, early 70s    Kidney failure Paternal Grandmother     Diabetes Paternal Grandmother     Suicide Paternal Grandfather     Breast cancer Paternal Aunt 75      Review of patient's allergies  indicates:  No Known Allergies       Review of Systems   Constitutional:  Negative for activity change, fever and unexpected weight change.   HENT:  Negative for sore throat.    Eyes:  Negative for visual disturbance.   Respiratory:  Negative for cough and shortness of breath.    Cardiovascular:  Negative for chest pain.   Gastrointestinal:  Negative for abdominal pain, diarrhea, nausea and vomiting.   Endocrine: Negative for polyuria.   Genitourinary:  Negative for dysuria and hot flashes.   Integumentary:  Negative for rash.   Neurological:  Negative for weakness and headaches.   Hematological:  Negative for adenopathy. Does not bruise/bleed easily.   Psychiatric/Behavioral:  Negative for confusion. The patient is nervous/anxious.        Objective:      Vitals:    04/12/23 1133   BP: 131/86   Pulse: 101   Resp: 18   Temp: 97.5 °F (36.4 °C)       Physical Exam  Constitutional:       General: She is not in acute distress.     Appearance: Normal appearance. She is not ill-appearing.   HENT:      Head: Normocephalic and atraumatic.      Nose: Nose normal.      Mouth/Throat:      Mouth: Mucous membranes are moist.      Pharynx: Oropharynx is clear.   Eyes:      Extraocular Movements: Extraocular movements intact.      Conjunctiva/sclera: Conjunctivae normal.      Pupils: Pupils are equal, round, and reactive to light.   Cardiovascular:      Rate and Rhythm: Normal rate and regular rhythm.      Pulses: Normal pulses.      Heart sounds: Normal heart sounds. No murmur heard.  Pulmonary:      Effort: Pulmonary effort is normal. No respiratory distress.      Breath sounds: Normal breath sounds.   Abdominal:      General: There is no distension.      Palpations: Abdomen is soft.      Tenderness: There is no abdominal tenderness.   Musculoskeletal:         General: Normal range of motion.      Cervical back: Normal range of motion and neck supple.      Right lower leg: No edema.      Left lower leg: No edema.    Lymphadenopathy:      Cervical: No cervical adenopathy.      Comments: No adenopathy noted bilaterally.   Skin:     General: Skin is warm and dry.   Neurological:      General: No focal deficit present.      Mental Status: She is alert and oriented to person, place, and time.       LABS AND IMAGING REVIEWED IN [facility]    Pathology:  6/21/22: [facility]:IMPRESSION: NEGATIVE    5/28/22 PAP Smear:  Positive- High [place] HPV  Positive- HPV 16    12/6/22 PAP Smear:  Positive- High Risk HPV  Positive- HPV 16    1/3/23 Cervical Biopsy:  Cervical Biopsy, 6 o'clock        - Squamous cell carcinoma, invasive, moderately differentiated  2.   Cervical Biopsy, 8 o'clock,         - Squamous cell carcinoma, invasive, moderately differentiated  3.   Cervical Biopsy, 10 o'clock        - Squamous cell carcinoma, invasive, moderately differentiated  4.   ECC        - Squamous cell carcinoma, invasive, moderately differentiated     Comment: Squamous cell carcinoma comprises almost the entirely of the tissue. Carcinoma involves the full thickness of these biopsies and invades at least 3 mm.    2/3/2023 PET/CT:    Hypermetabolic activity noted within the cervix. No regional or distant metastatic disease.    2/10/23 MRI Pelvis:  4.3cm diameter primary cervical mass. No extension into the parametrial fat appreciated. Small but suspicious appearing left external iliac chain lymph nodes      Assessment:   Stage 2B SCC of Cervix   Plan for definitive concurrent chemo/XRT with weekly cisplatin 40mg weekly  Followed by Dr. Gandhi for radiation, began 04/05/2023, currently on treatment 6/25  Audiology visit completed 04/05/2023.  C1 qw cisplatin given 04/06/2023.  MediPort placed 04/10/2023 by Dr. Pollack, healing well  Family history of cancer  Referral to genetics placed  Anxiety  Previously on cymbalta and ativan through PCP. Recommended she follow-up with PCP to begin on medication again. One month supply of ativan sent to pharmacy until able to  get appt with PCP.         Plan:   Labs stable.   Continue with radiation as scheduled.  Ativan sent to pharmacy.  FU with PCP for anxiety.   Continue with C2 of weekly cisplatin as scheduled 4/13/2023.  RTC in one week prior to chemo with NP for FU/lab/treat      ANTONINO PrestonP-C  Oncology/Hematology   [facility]

## 2023-04-13 ENCOUNTER — INFUSION (OUTPATIENT)
Dept: INFUSION THERAPY | Facility: HOSPITAL | Age: 48
End: 2023-04-13
Attending: NURSE PRACTITIONER
Payer: MEDICAID

## 2023-04-13 VITALS
BODY MASS INDEX: 35.16 KG/M2 | HEART RATE: 84 BPM | TEMPERATURE: 98 F | DIASTOLIC BLOOD PRESSURE: 96 MMHG | WEIGHT: 211 LBS | OXYGEN SATURATION: 98 % | RESPIRATION RATE: 18 BRPM | SYSTOLIC BLOOD PRESSURE: 142 MMHG | HEIGHT: 65 IN

## 2023-04-13 DIAGNOSIS — C53.8 MALIGNANT NEOPLASM OF OVERLAPPING SITES OF CERVIX: Primary | ICD-10-CM

## 2023-04-13 PROCEDURE — 96367 TX/PROPH/DG ADDL SEQ IV INF: CPT

## 2023-04-13 PROCEDURE — 96413 CHEMO IV INFUSION 1 HR: CPT

## 2023-04-13 PROCEDURE — 96366 THER/PROPH/DIAG IV INF ADDON: CPT

## 2023-04-13 PROCEDURE — 96375 TX/PRO/DX INJ NEW DRUG ADDON: CPT

## 2023-04-13 PROCEDURE — 96361 HYDRATE IV INFUSION ADD-ON: CPT

## 2023-04-13 PROCEDURE — 77412 RADIATION TX DELIVERY LVL 3: CPT | Performed by: RADIOLOGY

## 2023-04-13 PROCEDURE — 25000003 PHARM REV CODE 250

## 2023-04-13 PROCEDURE — A4216 STERILE WATER/SALINE, 10 ML: HCPCS

## 2023-04-13 PROCEDURE — 63600175 PHARM REV CODE 636 W HCPCS

## 2023-04-13 RX ORDER — HEPARIN 100 UNIT/ML
500 SYRINGE INTRAVENOUS
Status: ACTIVE | OUTPATIENT
Start: 2023-04-13

## 2023-04-13 RX ORDER — SODIUM CHLORIDE 0.9 % (FLUSH) 0.9 %
10 SYRINGE (ML) INJECTION
Status: ACTIVE | OUTPATIENT
Start: 2023-04-13

## 2023-04-13 RX ADMIN — APREPITANT 130 MG: 130 INJECTION, EMULSION INTRAVENOUS at 10:04

## 2023-04-13 RX ADMIN — CISPLATIN 84 MG: 50 INJECTION, SOLUTION INTRAVENOUS at 10:04

## 2023-04-13 RX ADMIN — DEXAMETHASONE SODIUM PHOSPHATE 0.25 MG: 10 INJECTION, SOLUTION INTRAMUSCULAR; INTRAVENOUS at 10:04

## 2023-04-13 RX ADMIN — SODIUM CHLORIDE 1000 ML: 9 INJECTION, SOLUTION INTRAVENOUS at 09:04

## 2023-04-13 RX ADMIN — POTASSIUM CHLORIDE: 2 INJECTION, SOLUTION, CONCENTRATE INTRAVENOUS at 12:04

## 2023-04-13 RX ADMIN — HEPARIN 500 UNITS: 100 SYRINGE at 02:04

## 2023-04-13 RX ADMIN — SODIUM CHLORIDE, PRESERVATIVE FREE 10 ML: 5 INJECTION INTRAVENOUS at 02:04

## 2023-04-14 PROCEDURE — 77412 RADIATION TX DELIVERY LVL 3: CPT | Performed by: RADIOLOGY

## 2023-04-17 PROCEDURE — 77336 RADIATION PHYSICS CONSULT: CPT | Performed by: RADIOLOGY

## 2023-04-17 PROCEDURE — 77412 RADIATION TX DELIVERY LVL 3: CPT | Performed by: RADIOLOGY

## 2023-04-18 DIAGNOSIS — Z51.11 ENCOUNTER FOR CHEMOTHERAPY MANAGEMENT: ICD-10-CM

## 2023-04-18 DIAGNOSIS — K21.9 GASTROESOPHAGEAL REFLUX DISEASE, UNSPECIFIED WHETHER ESOPHAGITIS PRESENT: Primary | ICD-10-CM

## 2023-04-18 DIAGNOSIS — C53.9 MALIGNANT NEOPLASM OF CERVIX, UNSPECIFIED SITE: ICD-10-CM

## 2023-04-18 PROCEDURE — 77412 RADIATION TX DELIVERY LVL 3: CPT | Performed by: RADIOLOGY

## 2023-04-19 ENCOUNTER — LAB VISIT (OUTPATIENT)
Dept: LAB | Facility: HOSPITAL | Age: 48
End: 2023-04-19
Payer: MEDICAID

## 2023-04-19 ENCOUNTER — OFFICE VISIT (OUTPATIENT)
Dept: HEMATOLOGY/ONCOLOGY | Facility: CLINIC | Age: 48
End: 2023-04-19
Payer: MEDICAID

## 2023-04-19 VITALS
HEIGHT: 65 IN | HEART RATE: 85 BPM | OXYGEN SATURATION: 98 % | DIASTOLIC BLOOD PRESSURE: 89 MMHG | RESPIRATION RATE: 20 BRPM | BODY MASS INDEX: 35.65 KG/M2 | TEMPERATURE: 98 F | SYSTOLIC BLOOD PRESSURE: 133 MMHG | WEIGHT: 214 LBS

## 2023-04-19 DIAGNOSIS — C53.8 MALIGNANT NEOPLASM OF OVERLAPPING SITES OF CERVIX: ICD-10-CM

## 2023-04-19 DIAGNOSIS — C53.9 MALIGNANT NEOPLASM OF CERVIX, UNSPECIFIED SITE: ICD-10-CM

## 2023-04-19 DIAGNOSIS — Z51.11 ENCOUNTER FOR CHEMOTHERAPY MANAGEMENT: ICD-10-CM

## 2023-04-19 DIAGNOSIS — K21.9 GASTROESOPHAGEAL REFLUX DISEASE, UNSPECIFIED WHETHER ESOPHAGITIS PRESENT: ICD-10-CM

## 2023-04-19 DIAGNOSIS — F41.9 ANXIETY: ICD-10-CM

## 2023-04-19 DIAGNOSIS — C53.9 MALIGNANT NEOPLASM OF CERVIX, UNSPECIFIED SITE: Primary | ICD-10-CM

## 2023-04-19 DIAGNOSIS — Z80.9 FAMILY HISTORY OF CANCER: ICD-10-CM

## 2023-04-19 LAB
ALBUMIN SERPL-MCNC: 3.9 G/DL (ref 3.5–5)
ALBUMIN/GLOB SERPL: 1.3 RATIO (ref 1.1–2)
ALP SERPL-CCNC: 74 UNIT/L (ref 40–150)
ALT SERPL-CCNC: 43 UNIT/L (ref 0–55)
AST SERPL-CCNC: 23 UNIT/L (ref 5–34)
BASOPHILS # BLD AUTO: 0.04 X10(3)/MCL (ref 0–0.2)
BASOPHILS NFR BLD AUTO: 0.6 %
BILIRUBIN DIRECT+TOT PNL SERPL-MCNC: 0.3 MG/DL
BUN SERPL-MCNC: 13 MG/DL (ref 7–18.7)
CALCIUM SERPL-MCNC: 9.4 MG/DL (ref 8.4–10.2)
CHLORIDE SERPL-SCNC: 101 MMOL/L (ref 98–107)
CO2 SERPL-SCNC: 27 MMOL/L (ref 22–29)
CREAT SERPL-MCNC: 0.76 MG/DL (ref 0.55–1.02)
EOSINOPHIL # BLD AUTO: 0.15 X10(3)/MCL (ref 0–0.9)
EOSINOPHIL NFR BLD AUTO: 2.3 %
ERYTHROCYTE [DISTWIDTH] IN BLOOD BY AUTOMATED COUNT: 11.6 % (ref 11.5–17)
GFR SERPLBLD CREATININE-BSD FMLA CKD-EPI: >60 MLS/MIN/1.73/M2
GLOBULIN SER-MCNC: 3.1 GM/DL (ref 2.4–3.5)
GLUCOSE SERPL-MCNC: 114 MG/DL (ref 74–100)
HCT VFR BLD AUTO: 39.5 % (ref 37–47)
HGB BLD-MCNC: 13 G/DL (ref 12–16)
IMM GRANULOCYTES # BLD AUTO: 0.03 X10(3)/MCL (ref 0–0.04)
IMM GRANULOCYTES NFR BLD AUTO: 0.5 %
LYMPHOCYTES # BLD AUTO: 0.61 X10(3)/MCL (ref 0.6–4.6)
LYMPHOCYTES NFR BLD AUTO: 9.5 %
MAGNESIUM SERPL-MCNC: 1.6 MG/DL (ref 1.6–2.6)
MCH RBC QN AUTO: 30.5 PG (ref 27–31)
MCHC RBC AUTO-ENTMCNC: 32.9 G/DL (ref 33–36)
MCV RBC AUTO: 92.7 FL (ref 80–94)
MONOCYTES # BLD AUTO: 0.76 X10(3)/MCL (ref 0.1–1.3)
MONOCYTES NFR BLD AUTO: 11.9 %
NEUTROPHILS # BLD AUTO: 4.8 X10(3)/MCL (ref 2.1–9.2)
NEUTROPHILS NFR BLD AUTO: 75.2 %
PHOSPHATE SERPL-MCNC: 2.9 MG/DL (ref 2.3–4.7)
PLATELET # BLD AUTO: 238 X10(3)/MCL (ref 130–400)
PMV BLD AUTO: 8.6 FL (ref 7.4–10.4)
POTASSIUM SERPL-SCNC: 3.6 MMOL/L (ref 3.5–5.1)
PROT SERPL-MCNC: 7 GM/DL (ref 6.4–8.3)
RBC # BLD AUTO: 4.26 X10(6)/MCL (ref 4.2–5.4)
SODIUM SERPL-SCNC: 137 MMOL/L (ref 136–145)
WBC # SPEC AUTO: 6.4 X10(3)/MCL (ref 4.5–11.5)

## 2023-04-19 PROCEDURE — 99999 PR PBB SHADOW E&M-EST. PATIENT-LVL IV: ICD-10-PCS | Mod: PBBFAC,,,

## 2023-04-19 PROCEDURE — 3079F DIAST BP 80-89 MM HG: CPT | Mod: CPTII,,,

## 2023-04-19 PROCEDURE — 36415 COLL VENOUS BLD VENIPUNCTURE: CPT

## 2023-04-19 PROCEDURE — 85025 COMPLETE CBC W/AUTO DIFF WBC: CPT

## 2023-04-19 PROCEDURE — 99215 OFFICE O/P EST HI 40 MIN: CPT | Mod: S$PBB,,,

## 2023-04-19 PROCEDURE — 84100 ASSAY OF PHOSPHORUS: CPT

## 2023-04-19 PROCEDURE — 99214 OFFICE O/P EST MOD 30 MIN: CPT | Mod: PBBFAC

## 2023-04-19 PROCEDURE — 99999 PR PBB SHADOW E&M-EST. PATIENT-LVL IV: CPT | Mod: PBBFAC,,,

## 2023-04-19 PROCEDURE — 77412 RADIATION TX DELIVERY LVL 3: CPT | Performed by: RADIOLOGY

## 2023-04-19 PROCEDURE — 3075F SYST BP GE 130 - 139MM HG: CPT | Mod: CPTII,,,

## 2023-04-19 PROCEDURE — 77417 THER RADIOLOGY PORT IMAGE(S): CPT | Performed by: RADIOLOGY

## 2023-04-19 PROCEDURE — 3079F PR MOST RECENT DIASTOLIC BLOOD PRESSURE 80-89 MM HG: ICD-10-PCS | Mod: CPTII,,,

## 2023-04-19 PROCEDURE — 1159F MED LIST DOCD IN RCRD: CPT | Mod: CPTII,,,

## 2023-04-19 PROCEDURE — 3008F BODY MASS INDEX DOCD: CPT | Mod: CPTII,,,

## 2023-04-19 PROCEDURE — 99215 PR OFFICE/OUTPT VISIT, EST, LEVL V, 40-54 MIN: ICD-10-PCS | Mod: S$PBB,,,

## 2023-04-19 PROCEDURE — 1159F PR MEDICATION LIST DOCUMENTED IN MEDICAL RECORD: ICD-10-PCS | Mod: CPTII,,,

## 2023-04-19 PROCEDURE — 83735 ASSAY OF MAGNESIUM: CPT

## 2023-04-19 PROCEDURE — 3008F PR BODY MASS INDEX (BMI) DOCUMENTED: ICD-10-PCS | Mod: CPTII,,,

## 2023-04-19 PROCEDURE — 1160F PR REVIEW ALL MEDS BY PRESCRIBER/CLIN PHARMACIST DOCUMENTED: ICD-10-PCS | Mod: CPTII,,,

## 2023-04-19 PROCEDURE — 1160F RVW MEDS BY RX/DR IN RCRD: CPT | Mod: CPTII,,,

## 2023-04-19 PROCEDURE — 3075F PR MOST RECENT SYSTOLIC BLOOD PRESS GE 130-139MM HG: ICD-10-PCS | Mod: CPTII,,,

## 2023-04-19 PROCEDURE — 80053 COMPREHEN METABOLIC PANEL: CPT

## 2023-04-19 RX ORDER — SODIUM CHLORIDE 0.9 % (FLUSH) 0.9 %
10 SYRINGE (ML) INJECTION
Status: CANCELLED | OUTPATIENT
Start: 2023-04-20

## 2023-04-19 RX ORDER — HEPARIN 100 UNIT/ML
500 SYRINGE INTRAVENOUS
Status: CANCELLED | OUTPATIENT
Start: 2023-04-20

## 2023-04-19 NOTE — PROGRESS NOTES
Subjective:       Patient ID: Lesley Estrada is a 47 y.o. female.    Chief Complaint: Cervical Cancer (1 week f/u with labs-- Patient reports no new concerns )    Diagnosis: Cervical Cancer Stage 2B     Current Treatment:   Cisplatin 40 mg q.week x6 cycles, rescanned with PET/CT three-month once completion of 6 cycles/radiation    Treatment History: None      HPI  47 yr old who presents for evaluation of cervical cancer in March 2023. Upon initial diagnosis in January, she had the option of definitive chemo/XRT vs total hysterectomy and decided to proceed with surgery with Dr. Rhodes. Unfortunately at the time of surgery, an exam was done that revealed obvious tumor extension onto the anterior of the vagina with some possible involvement of the left lateral vaginal apex. The tumor itself was thought to be approximately 5cm at the time of bimanual exam on 3/20. Given these new progressive findings it was decided that patient would be a better candidate for curative intent if she underwent concurrent chemo/RT. She has met with Dr. Gandhi and was referred to oncology for discussion of concurrent chemotherapy.     Today patient is accompanied by her two daughters. She has some interrmitent cervical bleeding and pain. Otherwise she has no complaints.     Family history includes a mother who had breast cancer at the age of 57 and her grandmother had ovarian cancer in her 40-50s and then developed breast cancer in her 90s as well as several other maternal family members with different types of malignancies.     I discussed her Diagnosis, prognosis, and recommended treatment options. I included NCCN guideline recommendations. I discussed the general toxicities of chemotherapy as well as specific concerns with cisplatin including ototoxicity, renal dysfunction, and neuropathy. She is willing to proceed.     Interval History:  She returns to the clinic today for a one-week after receiving C2 of cisplatin and for  treatment clearance for cycle 3 cisplatin scheduled for tomorrow.  Overall, she continues to tolerate chemotherapy well with minimal side effects.  She did have occasional nausea was controlled with current medication regimen.  She did have 1 episode of diarrhea that did not require medication.   She is currently on treatment 11 of 25 of radiation.  Her anxiety is currently controlled, and she has not made an appt with her PCP.  She denies any neuropathy, shortness of breath, chest pain, changes in stool, pain, fatigue, recent hospitalizations or illnesses.      Past Medical History:   Diagnosis Date    ADD (attention deficit disorder)     Cancer 2023    Cervical Cancer    Essential (primary) hypertension     Generalized anxiety disorder       Past Surgical History:   Procedure Laterality Date    Arm surgery Left     HAD STAPH INFECTION  IN ARM AND WAS DRAINED    PLACEMENT, MEDIPORT N/A 4/10/2023    Procedure: Placement, Mediport;  Surgeon: Alexus Pollack MD;  Location: [facility];  Service: General;  Laterality: N/A;     Social History     Socioeconomic History    Marital status:    Tobacco Use    Smoking status: Never    Smokeless tobacco: Never   Substance and Sexual Activity    Alcohol use: Yes     Comment: 4 X WEEK    Drug use: Never    Sexual activity: Yes     Partners: Male      Family History   Problem Relation Age of Onset    Breast cancer Mother 57    Other Father         Had stints in his heart    Ovarian cancer Maternal Grandmother         50s    Breast cancer Maternal Grandmother         late 80s    Stomach cancer Maternal Grandfather         late 60s, early 70s    Kidney failure Paternal Grandmother     Diabetes Paternal Grandmother     Suicide Paternal Grandfather     Breast cancer Paternal Aunt 75      Review of patient's allergies indicates:  No Known Allergies       Review of Systems   Constitutional:  Negative for activity change, fever and unexpected weight change.   HENT:  Negative for  sore throat.    Eyes:  Negative for visual disturbance.   Respiratory:  Negative for cough and shortness of breath.    Cardiovascular:  Negative for chest pain.   Gastrointestinal:  Negative for abdominal pain, diarrhea, nausea and vomiting.   Endocrine: Negative for polyuria.   Genitourinary:  Negative for dysuria and hot flashes.   Integumentary:  Negative for rash.   Neurological:  Negative for weakness and headaches.   Hematological:  Negative for adenopathy. Does not bruise/bleed easily.   Psychiatric/Behavioral:  Negative for confusion. The patient is nervous/anxious.        Objective:      Vitals:    04/19/23 0835   BP: 133/89   Pulse: 85   Resp: 20   Temp: 98.1 °F (36.7 °C)       Physical Exam  Constitutional:       General: She is not in acute distress.     Appearance: Normal appearance. She is not ill-appearing.   HENT:      Head: Normocephalic and atraumatic.      Nose: Nose normal.      Mouth/Throat:      Mouth: Mucous membranes are moist.      Pharynx: Oropharynx is clear.   Eyes:      Extraocular Movements: Extraocular movements intact.      Conjunctiva/sclera: Conjunctivae normal.      Pupils: Pupils are equal, round, and reactive to light.   Cardiovascular:      Rate and Rhythm: Normal rate and regular rhythm.      Pulses: Normal pulses.      Heart sounds: Normal heart sounds. No murmur heard.  Pulmonary:      Effort: Pulmonary effort is normal. No respiratory distress.      Breath sounds: Normal breath sounds.   Abdominal:      General: There is no distension.      Palpations: Abdomen is soft.      Tenderness: There is no abdominal tenderness.   Musculoskeletal:         General: Normal range of motion.      Cervical back: Normal range of motion and neck supple.      Right lower leg: No edema.      Left lower leg: No edema.   Lymphadenopathy:      Cervical: No cervical adenopathy.      Comments: No adenopathy noted bilaterally.   Skin:     General: Skin is warm and dry.   Neurological:      General:  No focal deficit present.      Mental Status: She is alert and oriented to person, place, and time.       LABS AND IMAGING REVIEWED IN [facility]    Pathology:  6/21/22: Joshua MMG:IMPRESSION: NEGATIVE    5/28/22 PAP Smear:  Positive- High Donell HPV  Positive- HPV 16    12/6/22 PAP Smear:  Positive- High Risk HPV  Positive- HPV 16    1/3/23 Cervical Biopsy:  Cervical Biopsy, 6 o'clock        - Squamous cell carcinoma, invasive, moderately differentiated  2.   Cervical Biopsy, 8 o'clock,         - Squamous cell carcinoma, invasive, moderately differentiated  3.   Cervical Biopsy, 10 o'clock        - Squamous cell carcinoma, invasive, moderately differentiated  4.   ECC        - Squamous cell carcinoma, invasive, moderately differentiated     Comment: Squamous cell carcinoma comprises almost the entirely of the tissue. Carcinoma involves the full thickness of these biopsies and invades at least 3 mm.    2/3/2023 PET/CT:    Hypermetabolic activity noted within the cervix. No regional or distant metastatic disease.    2/10/23 MRI Pelvis:  4.3cm diameter primary cervical mass. No extension into the parametrial fat appreciated. Small but suspicious appearing left external iliac chain lymph nodes      Assessment:   Stage 2B SCC of Cervix   Plan for definitive concurrent chemo/XRT with weekly cisplatin 40mg weekly  Followed by Dr. Gandhi for radiation, began 04/05/2023, currently on treatment 11/25  Audiology visit completed 04/05/2023.  C1 qw cisplatin given 04/06/2023.  MediPort placed 04/10/2023 by Dr. Pollack  Family history of cancer  Referral to genetics placed  Anxiety  Previously on cymbalta and ativan through PCP. Recommended she follow-up with PCP to begin on medication again. Continue ativan as needed until PCP appt.        Plan:   Labs stable.   Continue with radiation as scheduled.  FU with PCP for anxiety.   Continue with C3 of weekly cisplatin as scheduled 4/20/2023.  RTC in one week prior to chemo with NP for  FU/lab/treat      ANTONINO PrestonP-C  Oncology/Hematology   Cancer Center [facility]

## 2023-04-20 ENCOUNTER — INFUSION (OUTPATIENT)
Dept: INFUSION THERAPY | Facility: HOSPITAL | Age: 48
End: 2023-04-20
Attending: INTERNAL MEDICINE
Payer: MEDICAID

## 2023-04-20 VITALS
SYSTOLIC BLOOD PRESSURE: 127 MMHG | DIASTOLIC BLOOD PRESSURE: 88 MMHG | RESPIRATION RATE: 18 BRPM | TEMPERATURE: 98 F | HEIGHT: 65 IN | BODY MASS INDEX: 35.65 KG/M2 | WEIGHT: 214 LBS | OXYGEN SATURATION: 96 % | HEART RATE: 96 BPM

## 2023-04-20 DIAGNOSIS — C53.8 MALIGNANT NEOPLASM OF OVERLAPPING SITES OF CERVIX: Primary | ICD-10-CM

## 2023-04-20 PROCEDURE — 96415 CHEMO IV INFUSION ADDL HR: CPT

## 2023-04-20 PROCEDURE — 25000003 PHARM REV CODE 250

## 2023-04-20 PROCEDURE — 96409 CHEMO IV PUSH SNGL DRUG: CPT

## 2023-04-20 PROCEDURE — 63600175 PHARM REV CODE 636 W HCPCS

## 2023-04-20 PROCEDURE — 96413 CHEMO IV INFUSION 1 HR: CPT

## 2023-04-20 PROCEDURE — 96367 TX/PROPH/DG ADDL SEQ IV INF: CPT

## 2023-04-20 PROCEDURE — 96366 THER/PROPH/DIAG IV INF ADDON: CPT

## 2023-04-20 PROCEDURE — 96417 CHEMO IV INFUS EACH ADDL SEQ: CPT

## 2023-04-20 PROCEDURE — 77412 RADIATION TX DELIVERY LVL 3: CPT | Performed by: RADIOLOGY

## 2023-04-20 PROCEDURE — 96375 TX/PRO/DX INJ NEW DRUG ADDON: CPT

## 2023-04-20 RX ORDER — SODIUM CHLORIDE 0.9 % (FLUSH) 0.9 %
10 SYRINGE (ML) INJECTION
Status: ACTIVE | OUTPATIENT
Start: 2023-04-20

## 2023-04-20 RX ORDER — HEPARIN 100 UNIT/ML
500 SYRINGE INTRAVENOUS
Status: ACTIVE | OUTPATIENT
Start: 2023-04-20

## 2023-04-20 RX ADMIN — DEXAMETHASONE SODIUM PHOSPHATE 0.25 MG: 10 INJECTION, SOLUTION INTRAMUSCULAR; INTRAVENOUS at 09:04

## 2023-04-20 RX ADMIN — HEPARIN 500 UNITS: 100 SYRINGE at 12:04

## 2023-04-20 RX ADMIN — CISPLATIN 84 MG: 1 INJECTION, SOLUTION INTRAVENOUS at 10:04

## 2023-04-20 RX ADMIN — POTASSIUM CHLORIDE: 2 INJECTION, SOLUTION, CONCENTRATE INTRAVENOUS at 12:04

## 2023-04-20 RX ADMIN — SODIUM CHLORIDE 1000 ML: 9 INJECTION, SOLUTION INTRAVENOUS at 09:04

## 2023-04-20 RX ADMIN — APREPITANT 130 MG: 130 INJECTION, EMULSION INTRAVENOUS at 10:04

## 2023-04-21 PROCEDURE — 77412 RADIATION TX DELIVERY LVL 3: CPT | Performed by: RADIOLOGY

## 2023-04-24 ENCOUNTER — OFFICE VISIT (OUTPATIENT)
Dept: HEMATOLOGY/ONCOLOGY | Facility: CLINIC | Age: 48
End: 2023-04-24
Payer: MEDICAID

## 2023-04-24 DIAGNOSIS — Z80.3 FAMILY HISTORY OF BREAST CANCER: ICD-10-CM

## 2023-04-24 DIAGNOSIS — Z80.41 FAMILY HISTORY OF MALIGNANT NEOPLASM OF OVARY: ICD-10-CM

## 2023-04-24 DIAGNOSIS — Z80.9 FAMILY HISTORY OF CANCER: ICD-10-CM

## 2023-04-24 DIAGNOSIS — C53.9 MALIGNANT NEOPLASM OF CERVIX, UNSPECIFIED SITE: Primary | ICD-10-CM

## 2023-04-24 PROCEDURE — 99215 PR OFFICE/OUTPT VISIT, EST, LEVL V, 40-54 MIN: ICD-10-PCS | Mod: 95,,, | Performed by: NURSE PRACTITIONER

## 2023-04-24 PROCEDURE — 99215 OFFICE O/P EST HI 40 MIN: CPT | Mod: 95,,, | Performed by: NURSE PRACTITIONER

## 2023-04-24 PROCEDURE — 77336 RADIATION PHYSICS CONSULT: CPT | Performed by: RADIOLOGY

## 2023-04-24 PROCEDURE — 77412 RADIATION TX DELIVERY LVL 3: CPT | Performed by: RADIOLOGY

## 2023-04-24 PROCEDURE — 1159F PR MEDICATION LIST DOCUMENTED IN MEDICAL RECORD: ICD-10-PCS | Mod: CPTII,95,, | Performed by: NURSE PRACTITIONER

## 2023-04-24 PROCEDURE — 1159F MED LIST DOCD IN RCRD: CPT | Mod: CPTII,95,, | Performed by: NURSE PRACTITIONER

## 2023-04-24 RX ORDER — FLUOXETINE HCL 20 MG
20 CAPSULE ORAL EVERY MORNING
COMMUNITY
Start: 2023-04-19

## 2023-04-24 NOTE — PROGRESS NOTES
REFERRING PHYSICIAN:     Subjective:       Patient ID: Lesley Estrada is a 47 y.o. female.    Chief Complaint: Personal history of cervical cancer dx47y and a family history of cancer. Patient, her sister Angela Carlson  66, and daughter, Shilo Centeno  10/18/94, presented via Telemedicine Visit (audio and video) today for risk assessment, genetic counseling, and consideration for genetic testing.    HPI  Past Medical History:   Diagnosis Date    ADD (attention deficit disorder)     Cancer     Cervical Cancer    Essential (primary) hypertension     Generalized anxiety disorder         Past Surgical History:   Procedure Laterality Date    Arm surgery Left     HAD STAPH INFECTION  IN ARM AND WAS DRAINED    PLACEMENT, MEDIPORT N/A 4/10/2023    Procedure: Placement, Mediport;  Surgeon: Alexus Pollack MD;  Location: [facility];  Service: General;  Laterality: N/A;        Review of patient's allergies indicates:  Patient has no known allergies.     Review of Systems        Problem List Items Addressed This Visit    None       Oncology History    No history exists.      Family History   Problem Relation Age of Onset    Breast cancer Mother 56    Other Father         Had stints in his heart    Ovarian cancer Maternal Grandmother         50s    Breast cancer Maternal Grandmother         late 80s    Stomach cancer Maternal Grandfather         late 60s, early 70s    Kidney failure Paternal Grandmother     Diabetes Paternal Grandmother     Suicide Paternal Grandfather     Breast cancer Paternal Aunt 75            Assessment:   Risk Assessment:  This patient is at increased risk of having an inherited genetic mutation that increases her risk for cancer. She meets criteria for genetic testing based on the National Comprehensive Cancer Network (NCCN) criteria due to a family history that includes ovarian cancer (maternal grandmother) and breast cancer (mother, maternal grandmother) (see family history and  pedigree). Based on her likelihood of having a mutation, BRCA1/2 Analysis with [facility] CancerNext-Expanded+RNAinsight panel testing was described in detail.    Education and Counseling:  [facility]-Expanded evaluates a broad number of hereditary cancer syndromes to help define patients' cancer risk. This cancer panel tests (77 total): AIP, ALK, APC*, GURJIT*, AXIN2, BAP1, BARD1, BLM, BMPR1A, BRCA1*, BRCA2*, BRIP1*, CDC73, CDH1*, CDK4, CDKN1B, CDKN2A, CHEK2*, CTNNA1, DICER1, FANCC, FH, FLCN, GALNT12, KIF1B, LZTR1, MAX, MEN1, MET, MLH1*, MSH2*, MSH3, MSH6*, MUTYH*, NBN, NF1*, NF2, NTHL1, PALB2*, PHOX2B, PMS2*, POT1, WGTAG3B, PTCH1, PTEN*, RAD51C*, RAD51D*, RB1, RECQL, RET, SDHA, SDHAF2, SDHB, SDHC, SDHD, SMAD4, SMARCA4, SMARCB1, SMARCE1, STK11, SUFU, DNOZ111, TP53*, TSC1, TSC2, VHL and XRCC2 (sequencing and deletion/duplication); EGFR, EGLN1, HOXB13, KIT, MITF, PDGFRA, POLD1 and POLE (sequencing only); EPCAM and GREM1 (deletion/duplication only). DNA and RNA analyses performed for * genes.    Risks of cancer associated with inherited cancer predisposition mutations were discussed in detail.  If a mutation were found, this patient would have a significantly increased risk for cancer.  Inherited cancer syndromes included in this test, may have different, but still significant risk for cancer.  Risk of cancer with any particular gene mutation will be discussed at the time of results disclosure and based on the results.    The availability of clinical management options for inherited cancer predisposition mutation carriers was discussed, including increased surveillance, chemoprevention, and prophylactic surgery. Details of the testing process, including benefits and limitations of genetic analysis as well as the implications of possible test results, were discussed.  Because this patient is the first member of her family to be tested comprehensive panel testing was presented.  Related insurance issues  were discussed.      Summary:  This patient was evaluated for hereditary risk of cancer and was found to be at an increased risk of having an inherited cancer predisposition gene mutation.  The option of genetic testing was explained in detail, including the possible impact of this information on family members.  Since this patient wishes to proceed with testing an informed consent was obtained and blood drawn and sent to [facility].  Results will be expected 4 weeks from this time.  A follow-up appointment will be scheduled for results disclosure.       The patient location is: home.     Visit type: audiovisual    Face to Face time with patient: 40 minutes  >60 minutes of total time spent on the encounter, which includes face to face time and non-face to face time preparing to see the patient (eg, review of tests), Obtaining and/or reviewing separately obtained history, Documenting clinical information in the electronic or other health record, Independently interpreting results (not separately reported) and communicating results to the patient/family/caregiver, or Care coordination (not separately reported).       Each patient to whom he or she provides medical services by telemedicine is:  (1) informed of the relationship between the physician and patient and the respective role of any other health care provider with respect to management of the patient; and (2) notified that he or she may decline to receive medical services by telemedicine and may withdraw from such care at any time.    YAIMA JOSE, PhD      Awake/Alert/Cooperative

## 2023-04-25 PROCEDURE — 77412 RADIATION TX DELIVERY LVL 3: CPT | Performed by: RADIOLOGY

## 2023-04-26 ENCOUNTER — LAB VISIT (OUTPATIENT)
Dept: LAB | Facility: HOSPITAL | Age: 48
End: 2023-04-26
Payer: MEDICAID

## 2023-04-26 DIAGNOSIS — C53.9 MALIGNANT NEOPLASM OF CERVIX, UNSPECIFIED SITE: Primary | ICD-10-CM

## 2023-04-26 DIAGNOSIS — Z51.11 ENCOUNTER FOR CHEMOTHERAPY MANAGEMENT: ICD-10-CM

## 2023-04-26 DIAGNOSIS — C53.9 MALIGNANT NEOPLASM OF CERVIX, UNSPECIFIED SITE: ICD-10-CM

## 2023-04-26 LAB
ALBUMIN SERPL-MCNC: 4.1 G/DL (ref 3.5–5)
ALBUMIN/GLOB SERPL: 1.2 RATIO (ref 1.1–2)
ALP SERPL-CCNC: 81 UNIT/L (ref 40–150)
ALT SERPL-CCNC: 47 UNIT/L (ref 0–55)
AST SERPL-CCNC: 23 UNIT/L (ref 5–34)
B-HCG SERPL QL: NEGATIVE
BASOPHILS # BLD AUTO: 0.04 X10(3)/MCL (ref 0–0.2)
BASOPHILS NFR BLD AUTO: 0.8 %
BILIRUBIN DIRECT+TOT PNL SERPL-MCNC: 0.3 MG/DL
BUN SERPL-MCNC: 11 MG/DL (ref 7–18.7)
CALCIUM SERPL-MCNC: 9.7 MG/DL (ref 8.4–10.2)
CHLORIDE SERPL-SCNC: 102 MMOL/L (ref 98–107)
CO2 SERPL-SCNC: 26 MMOL/L (ref 22–29)
CREAT SERPL-MCNC: 0.71 MG/DL (ref 0.55–1.02)
EOSINOPHIL # BLD AUTO: 0.22 X10(3)/MCL (ref 0–0.9)
EOSINOPHIL NFR BLD AUTO: 4.4 %
ERYTHROCYTE [DISTWIDTH] IN BLOOD BY AUTOMATED COUNT: 12 % (ref 11.5–17)
GFR SERPLBLD CREATININE-BSD FMLA CKD-EPI: >60 MLS/MIN/1.73/M2
GLOBULIN SER-MCNC: 3.4 GM/DL (ref 2.4–3.5)
GLUCOSE SERPL-MCNC: 109 MG/DL (ref 74–100)
HCT VFR BLD AUTO: 39.7 % (ref 37–47)
HGB BLD-MCNC: 13.1 G/DL (ref 12–16)
IMM GRANULOCYTES # BLD AUTO: 0.02 X10(3)/MCL (ref 0–0.04)
IMM GRANULOCYTES NFR BLD AUTO: 0.4 %
LYMPHOCYTES # BLD AUTO: 0.58 X10(3)/MCL (ref 0.6–4.6)
LYMPHOCYTES NFR BLD AUTO: 11.7 %
MAGNESIUM SERPL-MCNC: 1.9 MG/DL (ref 1.6–2.6)
MCH RBC QN AUTO: 30.6 PG (ref 27–31)
MCHC RBC AUTO-ENTMCNC: 33 G/DL (ref 33–36)
MCV RBC AUTO: 92.8 FL (ref 80–94)
MONOCYTES # BLD AUTO: 0.59 X10(3)/MCL (ref 0.1–1.3)
MONOCYTES NFR BLD AUTO: 11.9 %
NEUTROPHILS # BLD AUTO: 3.52 X10(3)/MCL (ref 2.1–9.2)
NEUTROPHILS NFR BLD AUTO: 70.8 %
PHOSPHATE SERPL-MCNC: 3.1 MG/DL (ref 2.3–4.7)
PLATELET # BLD AUTO: 264 X10(3)/MCL (ref 130–400)
PMV BLD AUTO: 8.2 FL (ref 7.4–10.4)
POTASSIUM SERPL-SCNC: 4.5 MMOL/L (ref 3.5–5.1)
PROT SERPL-MCNC: 7.5 GM/DL (ref 6.4–8.3)
RBC # BLD AUTO: 4.28 X10(6)/MCL (ref 4.2–5.4)
SODIUM SERPL-SCNC: 138 MMOL/L (ref 136–145)
WBC # SPEC AUTO: 5 X10(3)/MCL (ref 4.5–11.5)

## 2023-04-26 PROCEDURE — 36415 COLL VENOUS BLD VENIPUNCTURE: CPT

## 2023-04-26 PROCEDURE — 80053 COMPREHEN METABOLIC PANEL: CPT

## 2023-04-26 PROCEDURE — 84100 ASSAY OF PHOSPHORUS: CPT

## 2023-04-26 PROCEDURE — 77417 THER RADIOLOGY PORT IMAGE(S): CPT | Performed by: RADIOLOGY

## 2023-04-26 PROCEDURE — 85025 COMPLETE CBC W/AUTO DIFF WBC: CPT

## 2023-04-26 PROCEDURE — 83735 ASSAY OF MAGNESIUM: CPT

## 2023-04-26 PROCEDURE — 81025 URINE PREGNANCY TEST: CPT

## 2023-04-26 PROCEDURE — 77412 RADIATION TX DELIVERY LVL 3: CPT | Performed by: RADIOLOGY

## 2023-04-27 ENCOUNTER — INFUSION (OUTPATIENT)
Dept: INFUSION THERAPY | Facility: HOSPITAL | Age: 48
End: 2023-04-27
Payer: MEDICAID

## 2023-04-27 ENCOUNTER — OFFICE VISIT (OUTPATIENT)
Dept: HEMATOLOGY/ONCOLOGY | Facility: CLINIC | Age: 48
End: 2023-04-27
Payer: MEDICAID

## 2023-04-27 VITALS
BODY MASS INDEX: 35.79 KG/M2 | HEART RATE: 83 BPM | OXYGEN SATURATION: 97 % | HEIGHT: 65 IN | SYSTOLIC BLOOD PRESSURE: 149 MMHG | WEIGHT: 214.81 LBS | BODY MASS INDEX: 35.79 KG/M2 | TEMPERATURE: 98 F | DIASTOLIC BLOOD PRESSURE: 89 MMHG | HEART RATE: 83 BPM | RESPIRATION RATE: 20 BRPM | TEMPERATURE: 98 F | DIASTOLIC BLOOD PRESSURE: 89 MMHG | OXYGEN SATURATION: 97 % | WEIGHT: 214.81 LBS | RESPIRATION RATE: 20 BRPM | SYSTOLIC BLOOD PRESSURE: 149 MMHG | HEIGHT: 65 IN

## 2023-04-27 DIAGNOSIS — Z80.41 FAMILY HISTORY OF MALIGNANT NEOPLASM OF OVARY: ICD-10-CM

## 2023-04-27 DIAGNOSIS — Z80.9 FAMILY HISTORY OF CANCER: ICD-10-CM

## 2023-04-27 DIAGNOSIS — Z51.11 ENCOUNTER FOR CHEMOTHERAPY MANAGEMENT: ICD-10-CM

## 2023-04-27 DIAGNOSIS — C53.8 MALIGNANT NEOPLASM OF OVERLAPPING SITES OF CERVIX: ICD-10-CM

## 2023-04-27 DIAGNOSIS — F41.9 ANXIETY: ICD-10-CM

## 2023-04-27 DIAGNOSIS — Z80.3 FAMILY HISTORY OF BREAST CANCER: ICD-10-CM

## 2023-04-27 DIAGNOSIS — C53.8 MALIGNANT NEOPLASM OF OVERLAPPING SITES OF CERVIX: Primary | ICD-10-CM

## 2023-04-27 DIAGNOSIS — C53.9 MALIGNANT NEOPLASM OF CERVIX, UNSPECIFIED SITE: Primary | ICD-10-CM

## 2023-04-27 DIAGNOSIS — R19.7 DIARRHEA, UNSPECIFIED TYPE: ICD-10-CM

## 2023-04-27 PROCEDURE — 96413 CHEMO IV INFUSION 1 HR: CPT

## 2023-04-27 PROCEDURE — 3008F PR BODY MASS INDEX (BMI) DOCUMENTED: ICD-10-PCS | Mod: CPTII,,,

## 2023-04-27 PROCEDURE — 99999 PR PBB SHADOW E&M-EST. PATIENT-LVL IV: CPT | Mod: PBBFAC,,,

## 2023-04-27 PROCEDURE — 1159F PR MEDICATION LIST DOCUMENTED IN MEDICAL RECORD: ICD-10-PCS | Mod: CPTII,,,

## 2023-04-27 PROCEDURE — 3079F DIAST BP 80-89 MM HG: CPT | Mod: CPTII,,,

## 2023-04-27 PROCEDURE — 99215 OFFICE O/P EST HI 40 MIN: CPT | Mod: S$PBB,,,

## 2023-04-27 PROCEDURE — 1160F PR REVIEW ALL MEDS BY PRESCRIBER/CLIN PHARMACIST DOCUMENTED: ICD-10-PCS | Mod: CPTII,,,

## 2023-04-27 PROCEDURE — 99999 PR PBB SHADOW E&M-EST. PATIENT-LVL IV: ICD-10-PCS | Mod: PBBFAC,,,

## 2023-04-27 PROCEDURE — 25000003 PHARM REV CODE 250

## 2023-04-27 PROCEDURE — 96375 TX/PRO/DX INJ NEW DRUG ADDON: CPT

## 2023-04-27 PROCEDURE — 3077F SYST BP >= 140 MM HG: CPT | Mod: CPTII,,,

## 2023-04-27 PROCEDURE — 99214 OFFICE O/P EST MOD 30 MIN: CPT | Mod: PBBFAC,25

## 2023-04-27 PROCEDURE — 77412 RADIATION TX DELIVERY LVL 3: CPT | Performed by: RADIOLOGY

## 2023-04-27 PROCEDURE — 1159F MED LIST DOCD IN RCRD: CPT | Mod: CPTII,,,

## 2023-04-27 PROCEDURE — 3008F BODY MASS INDEX DOCD: CPT | Mod: CPTII,,,

## 2023-04-27 PROCEDURE — 3079F PR MOST RECENT DIASTOLIC BLOOD PRESSURE 80-89 MM HG: ICD-10-PCS | Mod: CPTII,,,

## 2023-04-27 PROCEDURE — 63600175 PHARM REV CODE 636 W HCPCS

## 2023-04-27 PROCEDURE — 99215 PR OFFICE/OUTPT VISIT, EST, LEVL V, 40-54 MIN: ICD-10-PCS | Mod: S$PBB,,,

## 2023-04-27 PROCEDURE — 3077F PR MOST RECENT SYSTOLIC BLOOD PRESSURE >= 140 MM HG: ICD-10-PCS | Mod: CPTII,,,

## 2023-04-27 PROCEDURE — 1160F RVW MEDS BY RX/DR IN RCRD: CPT | Mod: CPTII,,,

## 2023-04-27 PROCEDURE — 96361 HYDRATE IV INFUSION ADD-ON: CPT

## 2023-04-27 RX ORDER — HEPARIN 100 UNIT/ML
500 SYRINGE INTRAVENOUS
Status: ACTIVE | OUTPATIENT
Start: 2023-04-27

## 2023-04-27 RX ORDER — SODIUM CHLORIDE 0.9 % (FLUSH) 0.9 %
10 SYRINGE (ML) INJECTION
Status: CANCELLED | OUTPATIENT
Start: 2023-04-27

## 2023-04-27 RX ORDER — HEPARIN 100 UNIT/ML
500 SYRINGE INTRAVENOUS
Status: CANCELLED | OUTPATIENT
Start: 2023-04-27

## 2023-04-27 RX ORDER — SODIUM CHLORIDE 0.9 % (FLUSH) 0.9 %
10 SYRINGE (ML) INJECTION
Status: ACTIVE | OUTPATIENT
Start: 2023-04-27

## 2023-04-27 RX ADMIN — APREPITANT 130 MG: 130 INJECTION, EMULSION INTRAVENOUS at 09:04

## 2023-04-27 RX ADMIN — CISPLATIN 84 MG: 1 INJECTION, SOLUTION INTRAVENOUS at 10:04

## 2023-04-27 RX ADMIN — DEXAMETHASONE SODIUM PHOSPHATE 0.25 MG: 10 INJECTION, SOLUTION INTRAMUSCULAR; INTRAVENOUS at 10:04

## 2023-04-27 RX ADMIN — MAGNESIUM SULFATE HEPTAHYDRATE: 500 INJECTION, SOLUTION INTRAMUSCULAR; INTRAVENOUS at 11:04

## 2023-04-27 RX ADMIN — SODIUM CHLORIDE 1000 ML: 9 INJECTION, SOLUTION INTRAVENOUS at 09:04

## 2023-04-27 RX ADMIN — HEPARIN 500 UNITS: 100 SYRINGE at 01:04

## 2023-04-27 NOTE — PROGRESS NOTES
Subjective:       Patient ID: Lesley Estrada is a 47 y.o. female.    Chief Complaint: Cervical Cancer (1 week f/u with labs-- Patient reports no new concerns )    Diagnosis: Cervical Cancer Stage 2B     Current Treatment:   Cisplatin 40 mg q.week x6 cycles, rescanned with PET/CT three-month once completion of 6 cycles/radiation    Treatment History: None      HPI  47 yr old who presents for evaluation of cervical cancer in March 2023. Upon initial diagnosis in January, she had the option of definitive chemo/XRT vs total hysterectomy and decided to proceed with surgery with Dr. Rhodes. Unfortunately at the time of surgery, an exam was done that revealed obvious tumor extension onto the anterior of the vagina with some possible involvement of the left lateral vaginal apex. The tumor itself was thought to be approximately 5cm at the time of bimanual exam on 3/20. Given these new progressive findings it was decided that patient would be a better candidate for curative intent if she underwent concurrent chemo/RT. She has met with Dr. Gandhi and was referred to oncology for discussion of concurrent chemotherapy.     Today patient is accompanied by her two daughters. She has some interrmitent cervical bleeding and pain. Otherwise she has no complaints.     Family history includes a mother who had breast cancer at the age of 57 and her grandmother had ovarian cancer in her 40-50s and then developed breast cancer in her 90s as well as several other maternal family members with different types of malignancies.     I discussed her Diagnosis, prognosis, and recommended treatment options. I included NCCN guideline recommendations. I discussed the general toxicities of chemotherapy as well as specific concerns with cisplatin including ototoxicity, renal dysfunction, and neuropathy. She is willing to proceed.     Interval History:  She returns to the clinic today for a one-week after receiving C3 of cisplatin and for  treatment clearance for cycle 4 cisplatin scheduled today. Overall, she continues to tolerate chemotherapy well with minimal side effects.  She did not report any nausea with last cycle.  She is currently on treatment 17 of 25 of radiation.  She did begin prozac for her anxiety last week which has helped significantly. She did begin to experience diarrhea shortly after beginning the medication. She is currently taking imodium for this and will be getting a lomotil prescription filled today as well. She denies any neuropathy, shortness of breath, chest pain, pain, fatigue, recent hospitalizations or illnesses.      Past Medical History:   Diagnosis Date    ADD (attention deficit disorder)     Cancer 2023    Cervical Cancer    Essential (primary) hypertension     Generalized anxiety disorder       Past Surgical History:   Procedure Laterality Date    Arm surgery Left     HAD STAPH INFECTION  IN ARM AND WAS DRAINED    PLACEMENT, MEDIPORT N/A 4/10/2023    Procedure: Placement, Mediport;  Surgeon: Alexus Pollack MD;  Location: [facility];  Service: General;  Laterality: N/A;     Social History     Socioeconomic History    Marital status:    Tobacco Use    Smoking status: Never    Smokeless tobacco: Never   Substance and Sexual Activity    Alcohol use: Yes     Comment: 4 X WEEK    Drug use: Never    Sexual activity: Yes     Partners: Male      Family History   Problem Relation Age of Onset    Breast cancer Mother 56    Other Father         Had stints in his heart    Ovarian cancer Maternal Grandmother         50s    Breast cancer Maternal Grandmother         late 80s    Stomach cancer Maternal Grandfather         late 60s, early 70s    Kidney failure Paternal Grandmother     Diabetes Paternal Grandmother     Suicide Paternal Grandfather     Breast cancer Paternal Aunt 75      Review of patient's allergies indicates:  No Known Allergies       Review of Systems   Constitutional:  Negative for activity change,  appetite change, chills, fatigue, fever and unexpected weight change.   HENT:  Negative for facial swelling, mouth sores, nosebleeds, sinus pressure/congestion and sore throat.    Eyes:  Negative for photophobia, pain and visual disturbance.   Respiratory:  Negative for cough, chest tightness, shortness of breath and wheezing.    Cardiovascular:  Negative for chest pain, palpitations and leg swelling.   Gastrointestinal:  Positive for change in bowel habit, diarrhea and change in bowel habit. Negative for abdominal pain, blood in stool, constipation, nausea and vomiting.   Endocrine: Negative.  Negative for polyuria.   Genitourinary:  Negative for dysuria, frequency, hematuria, hot flashes, pelvic pain, urgency and vaginal pain.   Musculoskeletal:  Negative for arthralgias, gait problem, joint swelling and myalgias.   Integumentary:  Negative for pallor, rash, wound, breast mass, breast discharge and breast tenderness.   Allergic/Immunologic: Negative.  Negative for immunocompromised state.   Neurological:  Negative for dizziness, vertigo, syncope, weakness, numbness and headaches.   Hematological:  Negative for adenopathy. Does not bruise/bleed easily.   Psychiatric/Behavioral:  Negative for behavioral problems, confusion and dysphoric mood. The patient is not nervous/anxious.    All other systems reviewed and are negative.  Breast: Negative for mass and tenderness      Objective:      Vitals:    04/27/23 0901   BP: (!) 149/89   Pulse: 83   Resp: 20   Temp: 98.4 °F (36.9 °C)       Physical Exam  Constitutional:       General: She is not in acute distress.     Appearance: Normal appearance. She is not ill-appearing.   HENT:      Head: Normocephalic and atraumatic.      Nose: Nose normal.      Mouth/Throat:      Mouth: Mucous membranes are moist.      Pharynx: Oropharynx is clear.   Eyes:      Extraocular Movements: Extraocular movements intact.      Conjunctiva/sclera: Conjunctivae normal.      Pupils: Pupils are  equal, round, and reactive to light.   Cardiovascular:      Rate and Rhythm: Normal rate and regular rhythm.      Pulses: Normal pulses.      Heart sounds: Normal heart sounds. No murmur heard.  Pulmonary:      Effort: Pulmonary effort is normal. No respiratory distress.      Breath sounds: Normal breath sounds.   Abdominal:      General: There is no distension.      Palpations: Abdomen is soft.      Tenderness: There is no abdominal tenderness.   Musculoskeletal:         General: Normal range of motion.      Cervical back: Normal range of motion and neck supple.      Right lower leg: No edema.      Left lower leg: No edema.   Lymphadenopathy:      Cervical: No cervical adenopathy.      Comments: No adenopathy noted bilaterally.   Skin:     General: Skin is warm and dry.   Neurological:      General: No focal deficit present.      Mental Status: She is alert and oriented to person, place, and time.       LABS AND IMAGING REVIEWED IN [facility]    Pathology:  6/21/22: Joshua MMG:IMPRESSION: NEGATIVE    5/28/22 PAP Smear:  Positive- High Donell HPV  Positive- HPV 16    12/6/22 PAP Smear:  Positive- High Risk HPV  Positive- HPV 16    1/3/23 Cervical Biopsy:  Cervical Biopsy, 6 o'clock        - Squamous cell carcinoma, invasive, moderately differentiated  2.   Cervical Biopsy, 8 o'clock,         - Squamous cell carcinoma, invasive, moderately differentiated  3.   Cervical Biopsy, 10 o'clock        - Squamous cell carcinoma, invasive, moderately differentiated  4.   ECC        - Squamous cell carcinoma, invasive, moderately differentiated     Comment: Squamous cell carcinoma comprises almost the entirely of the tissue. Carcinoma involves the full thickness of these biopsies and invades at least 3 mm.    2/3/2023 PET/CT:    Hypermetabolic activity noted within the cervix. No regional or distant metastatic disease.    2/10/23 MRI Pelvis:  4.3cm diameter primary cervical mass. No extension into the parametrial fat appreciated. Small  but suspicious appearing left external iliac chain lymph nodes      Assessment:   Stage 2B SCC of Cervix   Plan for definitive concurrent chemo/XRT with weekly cisplatin 40mg weekly  Followed by Dr. Gandhi for radiation, began 04/05/2023, currently on treatment 17/25  Audiology visit completed 04/05/2023.  C1 qw cisplatin given 04/06/2023.  MediPort placed 04/10/2023 by Dr. Pollack  Current chemotherapy  Cisplatin weekly x 6 cycles with PET/CT three months once completion of 6 cycles/radiation  Currently due for c4 cisplatin today. Labs are stable and adequate to continue with treatment today.   Family history of cancer  Seen by genetics 4/24/2023 with follow-up in four weeks.   Anxiety  Stable today. Placed on Prozac by PCP.   Diarrhea   Began last week around same time of start of prozac. Will do stool culture and Cdiff today. Imodium and lomotil as needed. Will continue to monitor at this time.         Plan:   Labs stable.   Continue with radiation as scheduled.  FU with PCP for anxiety.   Continue with C4 of weekly cisplatin as scheduled today.  RTC in one week prior to chemo with NP for FU/lab/treat      ANTONINO PrestonP-C  Oncology/Hematology   Cancer Center [facility]

## 2023-04-28 PROCEDURE — 77412 RADIATION TX DELIVERY LVL 3: CPT | Performed by: RADIOLOGY

## 2023-04-29 ENCOUNTER — HOSPITAL ENCOUNTER (EMERGENCY)
Facility: HOSPITAL | Age: 48
Discharge: HOME | End: 2023-04-29
Attending: EMERGENCY MEDICINE
Payer: MEDICAID

## 2023-04-29 VITALS
SYSTOLIC BLOOD PRESSURE: 163 MMHG | DIASTOLIC BLOOD PRESSURE: 96 MMHG | WEIGHT: 215 LBS | RESPIRATION RATE: 16 BRPM | BODY MASS INDEX: 35.82 KG/M2 | OXYGEN SATURATION: 99 % | TEMPERATURE: 98 F | HEART RATE: 86 BPM | HEIGHT: 65 IN

## 2023-04-29 DIAGNOSIS — R30.0 DYSURIA: ICD-10-CM

## 2023-04-29 DIAGNOSIS — N30.90 CYSTITIS: Primary | ICD-10-CM

## 2023-04-29 DIAGNOSIS — C53.9 MALIGNANT NEOPLASM OF CERVIX, UNSPECIFIED SITE: ICD-10-CM

## 2023-04-29 LAB
APPEARANCE UR: CLEAR
B-HCG SERPL QL: NEGATIVE
BACTERIA #/AREA URNS AUTO: ABNORMAL /HPF
BILIRUB UR QL STRIP.AUTO: NEGATIVE MG/DL
COLOR UR AUTO: ABNORMAL
GLUCOSE UR QL STRIP.AUTO: NEGATIVE MG/DL
KETONES UR QL STRIP.AUTO: NEGATIVE MG/DL
LEUKOCYTE ESTERASE UR QL STRIP.AUTO: ABNORMAL UNIT/L
NITRITE UR QL STRIP.AUTO: NEGATIVE
PH UR STRIP.AUTO: 7 [PH]
PROT UR QL STRIP.AUTO: ABNORMAL MG/DL
RBC #/AREA URNS AUTO: ABNORMAL /HPF
RBC UR QL AUTO: ABNORMAL UNIT/L
SP GR UR STRIP.AUTO: 1.02
SQUAMOUS #/AREA URNS AUTO: ABNORMAL /HPF
UROBILINOGEN UR STRIP-ACNC: 0.2 MG/DL
WBC #/AREA URNS AUTO: ABNORMAL /HPF

## 2023-04-29 PROCEDURE — 87186 SC STD MICRODIL/AGAR DIL: CPT | Performed by: EMERGENCY MEDICINE

## 2023-04-29 PROCEDURE — 81025 URINE PREGNANCY TEST: CPT | Performed by: EMERGENCY MEDICINE

## 2023-04-29 PROCEDURE — 99284 EMERGENCY DEPT VISIT MOD MDM: CPT

## 2023-04-29 PROCEDURE — 87088 URINE BACTERIA CULTURE: CPT | Performed by: EMERGENCY MEDICINE

## 2023-04-29 PROCEDURE — 25000003 PHARM REV CODE 250: Performed by: EMERGENCY MEDICINE

## 2023-04-29 PROCEDURE — 81001 URINALYSIS AUTO W/SCOPE: CPT | Performed by: EMERGENCY MEDICINE

## 2023-04-29 RX ORDER — CEFDINIR 300 MG/1
300 CAPSULE ORAL 2 TIMES DAILY
Qty: 14 CAPSULE | Refills: 0 | Status: SHIPPED | OUTPATIENT
Start: 2023-04-29 | End: 2023-05-06

## 2023-04-29 RX ORDER — PHENAZOPYRIDINE HYDROCHLORIDE 200 MG/1
200 TABLET, FILM COATED ORAL 3 TIMES DAILY
Qty: 6 TABLET | Refills: 0 | Status: SHIPPED | OUTPATIENT
Start: 2023-04-29 | End: 2023-05-09

## 2023-04-29 RX ORDER — CEFDINIR 300 MG/1
300 CAPSULE ORAL EVERY 12 HOURS
Status: DISCONTINUED | OUTPATIENT
Start: 2023-04-29 | End: 2023-04-29 | Stop reason: HOSPADM

## 2023-04-29 RX ADMIN — CEFDINIR 300 MG: 300 CAPSULE ORAL at 09:04

## 2023-04-29 NOTE — DISCHARGE INSTRUCTIONS
Pyridium will turn your urine reddish orange    Do not wear soft contacts why you take the Pyridium they can get  reddish orange also    Be aware that both your chemo agent and radiation can cause urinary tract infection symptoms.    Urine will be cultured if needed you will be contacted to change  antibiotics

## 2023-04-29 NOTE — ED PROVIDER NOTES
Encounter Date: 2023       History     Chief Complaint   Patient presents with    Dysuria     C/o burning when she urinates since last night     Dysuria frequency sudden onset last night.  No associated fever chills nausea vomiting is currently undergoing both radiation and chemotherapy for a cervical cancer.  Past medical history significant for cervical cancer diagnosed in March apparently initiated treatment this month.    Denies fever denies chills denies any abnormal vaginal bleeding.  Has a history of kidney infection last September she states.      No known drug allergies.      Does not use tobacco alcohol or drugs.  She had no COVID shots no pneumonia shots she has had a flu vaccine no tetanus in the last 5 years.  Surgical history none.   3 para 3 current lead  lives with 1 of her daughters.  Last cycle was .  It was normal   she sees Lashon Peng for primary care.  She sees Radiation Oncology and [facility].      Mother is  with breast cancer dad is alive with coronary artery disease.  No known drug allergies    Review of patient's allergies indicates:  No Known Allergies  Past Medical History:   Diagnosis Date    ADD (attention deficit disorder)     Cancer     Cervical Cancer    Essential (primary) hypertension     Generalized anxiety disorder      Past Surgical History:   Procedure Laterality Date    Arm surgery Left     HAD STAPH INFECTION  IN ARM AND WAS DRAINED    PLACEMENT, MEDIPORT N/A 4/10/2023    Procedure: Placement, Mediport;  Surgeon: Alexus Pollack MD;  Location: [facility];  Service: General;  Laterality: N/A;     Family History   Problem Relation Age of Onset    Breast cancer Mother 56    Other Father         Had stints in his heart    Ovarian cancer Maternal Grandmother         50s    Breast cancer Maternal Grandmother         late 80s    Stomach cancer Maternal Grandfather         late 60s, early 70s    Kidney failure Paternal  Grandmother     Diabetes Paternal Grandmother     Suicide Paternal Grandfather     Breast cancer Paternal Aunt 75     Social History     Tobacco Use    Smoking status: Never    Smokeless tobacco: Never   Substance Use Topics    Alcohol use: Yes     Comment: 4 X WEEK    Drug use: Never     Review of Systems   Constitutional: Negative.  Negative for fever.   HENT: Negative.     Eyes: Negative.    Respiratory: Negative.     Cardiovascular: Negative.    Gastrointestinal: Negative.    Endocrine: Negative.    Genitourinary:  Positive for dysuria and frequency. Negative for vaginal bleeding.   Musculoskeletal: Negative.    Skin: Negative.    Allergic/Immunologic: Negative.    Neurological: Negative.    Hematological: Negative.    Psychiatric/Behavioral: Negative.       Physical Exam     Initial Vitals [04/29/23 0849]   BP Pulse Resp Temp SpO2   (!) 163/96 86 16 97.9 °F (36.6 °C) 99 %      MAP       --         Physical Exam    Nursing note and vitals reviewed.  Constitutional: She appears well-developed and well-nourished.   HENT:   Head: Normocephalic and atraumatic.   Right Ear: Tympanic membrane and external ear normal.   Left Ear: Tympanic membrane and external ear normal.   Nose: Nose normal.   Mouth/Throat: Oropharynx is clear and moist and mucous membranes are normal.   Eyes: EOM are normal. Pupils are equal, round, and reactive to light.   Neck: Neck supple. No thyromegaly present. No tracheal deviation present. No JVD present.   Normal range of motion.  Cardiovascular:  Normal rate, regular rhythm and normal heart sounds.     Exam reveals no gallop and no friction rub.       No murmur heard.  Pulmonary/Chest: Breath sounds normal. No stridor. No respiratory distress. She has no wheezes. She has no rhonchi. She has no rales. She exhibits no tenderness.   Abdominal: Abdomen is soft. Bowel sounds are normal. She exhibits no distension and no mass. There is no abdominal tenderness.   Genitourinary:    Genitourinary  Comments: No CVA tenderness no obvious tenderness or masses intra-abdominal or suprapubic     Musculoskeletal:         General: No tenderness or edema. Normal range of motion.      Cervical back: Normal range of motion and neck supple.     Lymphadenopathy:     She has no cervical adenopathy.   Neurological: She is alert and oriented to person, place, and time. She has normal strength and normal reflexes. No cranial nerve deficit. GCS score is 15. GCS eye subscore is 4. GCS verbal subscore is 5. GCS motor subscore is 6.   Skin: Skin is warm and dry. Capillary refill takes 2 to 3 seconds. No rash noted.   Psychiatric: She has a normal mood and affect. Her behavior is normal. Judgment and thought content normal.       ED Course   Procedures  Labs Reviewed   URINALYSIS, REFLEX TO URINE CULTURE - Abnormal; Notable for the following components:       Result Value    Color, UA Red (*)     Protein, UA Trace (*)     Blood, UA 2+ (*)     Leukocyte Esterase, UA 1+ (*)     All other components within normal limits   URINALYSIS, MICROSCOPIC - Abnormal; Notable for the following components:    Bacteria, UA Few (*)     RBC, UA 6-10 (*)     WBC, UA 21-50 (*)     Squamous Epithelial Cells, UA Few (*)     All other components within normal limits   CULTURE, URINE   HCG QUALITATIVE URINE          Imaging Results    None          Medications   cefdinir capsule 300 mg (300 mg Oral Given 4/29/23 0922)     Medical Decision Making:   Initial Assessment:   Complains of dysuria sudden onset last night is currently on chemo and radiation for cervical cancer.  Did discuss with patient radiation induced symptoms can mimic infection as can chemo in some cases  Normocephalic atraumatic heart is regular rate and rhythm lungs are clear belly seems quite benign neurological seems appropriate  Differential Diagnosis:   Cystitis radiation induced cystitis hemorrhagic cystitis bladder infection, chemotherapy induced cystitis symptoms  Clinical Tests:    Lab Tests: Reviewed and Ordered       <> Summary of Lab: Recent pregnancy test was negative.  Urine has both infection signs white cells chemical markers and bacteria patient is symptomatic consistent with urinary tract infection  ED Management:  Treatment initial dose of Omnicef given here she bought some azo.  But asked for something else so we prescribe Pyridium cautioned it will turn her urine bright red wrote on instructions regarding soft context being   MDM  Problems addressed  Co-morbidities and/or factors adding to the complexity or risk for the patient:  Currently on chemo and radiation for cervical cancer  Problems addressed:  Sudden onset of hematuria dysuria and frequency  Acute problem/illness or progression/exacerbation of chronic problem with potential threat to life/bodily dysfunction?:  Currently on chemo and radiation for cervical cancer history of generalized anxiety and ADD.  Differential diagnoses/problems considered: see above     Amount and/or Complexity of Data Reviewed  Independent Historian: none (see above for summary)  External Data Reviewed: notes from clinic visits and prior labs (see above for summary)  Risk and benefits of testing: discussed   Labs: Labs: ordered and reviewed  Radiology:Radiology: ordered and independent interpretation performed (see above or ED course)  ECG/medicine tests:Radiology: ordered and independent interpretation performed (see above or ED course)  none    Risk  Prescription drug management   Shared decision making     Critical Care  none                         Clinical Impression:   Final diagnoses:  [N30.90] Cystitis (Primary)  [R30.0] Dysuria  [C53.9] Malignant neoplasm of cervix, unspecified site        ED Disposition Condition    Discharge Stable          ED Prescriptions       Medication Sig Dispense Start Date End Date Auth. Provider    cefdinir (OMNICEF) 300 MG capsule Take 1 capsule (300 mg total) by mouth 2 (two) times daily. for 7 days 14  capsule 4/29/2023 5/6/2023 Prabhu Young MD    phenazopyridine (PYRIDIUM) 200 MG tablet Take 1 tablet (200 mg total) by mouth 3 (three) times daily. for 10 days 6 tablet 4/29/2023 5/9/2023 Prabhu Young MD          Follow-up Information       Follow up With Specialties Details Why Contact Info    Lashon Peng, [facility] In 3 days  [address] [facility] [address]  [phone]               Prabhu Young MD  04/29/23 0926       Prabhu Young MD  04/29/23 0927

## 2023-05-01 ENCOUNTER — OFFICE VISIT (OUTPATIENT)
Dept: RADIATION THERAPY | Facility: HOSPITAL | Age: 48
End: 2023-05-01
Attending: RADIOLOGY
Payer: MEDICAID

## 2023-05-01 LAB — BACTERIA UR CULT: ABNORMAL

## 2023-05-01 PROCEDURE — 77412 RADIATION TX DELIVERY LVL 3: CPT | Performed by: RADIOLOGY

## 2023-05-01 PROCEDURE — 77336 RADIATION PHYSICS CONSULT: CPT | Performed by: RADIOLOGY

## 2023-05-02 PROCEDURE — 77412 RADIATION TX DELIVERY LVL 3: CPT | Performed by: RADIOLOGY

## 2023-05-03 ENCOUNTER — OFFICE VISIT (OUTPATIENT)
Dept: HEMATOLOGY/ONCOLOGY | Facility: CLINIC | Age: 48
End: 2023-05-03
Payer: MEDICAID

## 2023-05-03 VITALS
HEIGHT: 65 IN | HEART RATE: 92 BPM | SYSTOLIC BLOOD PRESSURE: 132 MMHG | DIASTOLIC BLOOD PRESSURE: 91 MMHG | RESPIRATION RATE: 18 BRPM | WEIGHT: 213.38 LBS | BODY MASS INDEX: 35.55 KG/M2 | TEMPERATURE: 98 F | OXYGEN SATURATION: 98 %

## 2023-05-03 DIAGNOSIS — R19.7 DIARRHEA, UNSPECIFIED TYPE: ICD-10-CM

## 2023-05-03 DIAGNOSIS — Z51.11 ENCOUNTER FOR CHEMOTHERAPY MANAGEMENT: ICD-10-CM

## 2023-05-03 DIAGNOSIS — C53.9 MALIGNANT NEOPLASM OF CERVIX, UNSPECIFIED SITE: Primary | ICD-10-CM

## 2023-05-03 DIAGNOSIS — Z80.3 FAMILY HISTORY OF BREAST CANCER: ICD-10-CM

## 2023-05-03 DIAGNOSIS — F41.9 ANXIETY: ICD-10-CM

## 2023-05-03 DIAGNOSIS — Z80.9 FAMILY HISTORY OF CANCER: ICD-10-CM

## 2023-05-03 PROCEDURE — 3080F DIAST BP >= 90 MM HG: CPT | Mod: CPTII,,,

## 2023-05-03 PROCEDURE — 3075F PR MOST RECENT SYSTOLIC BLOOD PRESS GE 130-139MM HG: ICD-10-PCS | Mod: CPTII,,,

## 2023-05-03 PROCEDURE — 3080F PR MOST RECENT DIASTOLIC BLOOD PRESSURE >= 90 MM HG: ICD-10-PCS | Mod: CPTII,,,

## 2023-05-03 PROCEDURE — 3075F SYST BP GE 130 - 139MM HG: CPT | Mod: CPTII,,,

## 2023-05-03 PROCEDURE — 99215 OFFICE O/P EST HI 40 MIN: CPT | Mod: S$PBB,,,

## 2023-05-03 PROCEDURE — 3008F PR BODY MASS INDEX (BMI) DOCUMENTED: ICD-10-PCS | Mod: CPTII,,,

## 2023-05-03 PROCEDURE — 1159F MED LIST DOCD IN RCRD: CPT | Mod: CPTII,,,

## 2023-05-03 PROCEDURE — 3008F BODY MASS INDEX DOCD: CPT | Mod: CPTII,,,

## 2023-05-03 PROCEDURE — 77417 THER RADIOLOGY PORT IMAGE(S): CPT | Performed by: RADIOLOGY

## 2023-05-03 PROCEDURE — 99215 PR OFFICE/OUTPT VISIT, EST, LEVL V, 40-54 MIN: ICD-10-PCS | Mod: S$PBB,,,

## 2023-05-03 PROCEDURE — 1160F PR REVIEW ALL MEDS BY PRESCRIBER/CLIN PHARMACIST DOCUMENTED: ICD-10-PCS | Mod: CPTII,,,

## 2023-05-03 PROCEDURE — 77412 RADIATION TX DELIVERY LVL 3: CPT | Performed by: RADIOLOGY

## 2023-05-03 PROCEDURE — 99999 PR PBB SHADOW E&M-EST. PATIENT-LVL V: CPT | Mod: PBBFAC,,,

## 2023-05-03 PROCEDURE — 1159F PR MEDICATION LIST DOCUMENTED IN MEDICAL RECORD: ICD-10-PCS | Mod: CPTII,,,

## 2023-05-03 PROCEDURE — 1160F RVW MEDS BY RX/DR IN RCRD: CPT | Mod: CPTII,,,

## 2023-05-03 PROCEDURE — 99999 PR PBB SHADOW E&M-EST. PATIENT-LVL V: ICD-10-PCS | Mod: PBBFAC,,,

## 2023-05-03 PROCEDURE — 99215 OFFICE O/P EST HI 40 MIN: CPT | Mod: PBBFAC,25

## 2023-05-03 RX ORDER — SODIUM CHLORIDE 0.9 % (FLUSH) 0.9 %
10 SYRINGE (ML) INJECTION
Status: CANCELLED | OUTPATIENT
Start: 2023-05-04

## 2023-05-03 RX ORDER — HEPARIN 100 UNIT/ML
500 SYRINGE INTRAVENOUS
Status: CANCELLED | OUTPATIENT
Start: 2023-05-04

## 2023-05-03 RX ORDER — DIPHENOXYLATE HYDROCHLORIDE AND ATROPINE SULFATE 2.5; .025 MG/1; MG/1
3 TABLET ORAL 3 TIMES DAILY
COMMUNITY
Start: 2023-04-27

## 2023-05-03 NOTE — PROGRESS NOTES
Subjective:       Patient ID: Lesley Estrada is a 47 y.o. female.    Chief Complaint: Cervical Cancer (Pt reports no issues or concerns today)    Diagnosis: Cervical Cancer Stage 2B     Current Treatment:   Cisplatin 40 mg q.week x6 cycles, rescanned with PET/CT three-month once completion of 6 cycles/radiation    Treatment History: None      HPI  47 yr old who presents for evaluation of cervical cancer in March 2023. Upon initial diagnosis in January, she had the option of definitive chemo/XRT vs total hysterectomy and decided to proceed with surgery with Dr. Rhodes. Unfortunately at the time of surgery, an exam was done that revealed obvious tumor extension onto the anterior of the vagina with some possible involvement of the left lateral vaginal apex. The tumor itself was thought to be approximately 5cm at the time of bimanual exam on 3/20. Given these new progressive findings it was decided that patient would be a better candidate for curative intent if she underwent concurrent chemo/RT. She has met with Dr. Gandhi and was referred to oncology for discussion of concurrent chemotherapy.     Today patient is accompanied by her two daughters. She has some interrmitent cervical bleeding and pain. Otherwise she has no complaints.     Family history includes a mother who had breast cancer at the age of 57 and her grandmother had ovarian cancer in her 40-50s and then developed breast cancer in her 90s as well as several other maternal family members with different types of malignancies.     I discussed her Diagnosis, prognosis, and recommended treatment options. I included NCCN guideline recommendations. I discussed the general toxicities of chemotherapy as well as specific concerns with cisplatin including ototoxicity, renal dysfunction, and neuropathy. She is willing to proceed.     Interval History:  She returns to the clinic today for a one-week after receiving C4 of cisplatin and for treatment clearance for  cycle 5 cisplatin scheduled tomorrow. Overall, she continues to tolerate chemotherapy well with minimal side effects.  She was diagnosed with a UTI and is currently on antibiotics for this, now feeling better.  She is scheduled to finish radiation 05/09/2023.  She reports she is scheduled for an MRI on Friday with Dr. Gandhi to further evaluate need for internal beam radiation.  Anxiety is currently controlled.  Diarrhea occurs occasionally but is controlled with medications.  She denies any neuropathy, shortness of breath, chest pain, pain, fatigue, recent hospitalizations or illnesses.      Past Medical History:   Diagnosis Date    ADD (attention deficit disorder)     Cancer 2023    Cervical Cancer    Essential (primary) hypertension     Generalized anxiety disorder       Past Surgical History:   Procedure Laterality Date    Arm surgery Left     HAD STAPH INFECTION  IN ARM AND WAS DRAINED    PLACEMENT, MEDIPORT N/A 4/10/2023    Procedure: Placement, Mediport;  Surgeon: Alexus Pollack MD;  Location: [facility];  Service: General;  Laterality: N/A;     Social History     Socioeconomic History    Marital status:    Tobacco Use    Smoking status: Never    Smokeless tobacco: Never   Substance and Sexual Activity    Alcohol use: Yes     Comment: 4 X WEEK    Drug use: Never    Sexual activity: Yes     Partners: Male      Family History   Problem Relation Age of Onset    Breast cancer Mother 56    Other Father         Had stints in his heart    Ovarian cancer Maternal Grandmother         50s    Breast cancer Maternal Grandmother         late 80s    Stomach cancer Maternal Grandfather         late 60s, early 70s    Kidney failure Paternal Grandmother     Diabetes Paternal Grandmother     Suicide Paternal Grandfather     Breast cancer Paternal Aunt 75      Review of patient's allergies indicates:  No Known Allergies       Review of Systems   Constitutional:  Negative for activity change, appetite change, chills,  fatigue, fever and unexpected weight change.   HENT:  Negative for facial swelling, mouth sores, nosebleeds, sinus pressure/congestion and sore throat.    Eyes:  Negative for photophobia, pain and visual disturbance.   Respiratory:  Negative for cough, chest tightness, shortness of breath and wheezing.    Cardiovascular:  Negative for chest pain, palpitations and leg swelling.   Gastrointestinal:  Positive for change in bowel habit, diarrhea and change in bowel habit. Negative for abdominal pain, blood in stool, constipation, nausea and vomiting.   Endocrine: Negative.  Negative for polyuria.   Genitourinary:  Negative for dysuria, frequency, hematuria, hot flashes, pelvic pain, urgency and vaginal pain.   Musculoskeletal:  Negative for arthralgias, gait problem, joint swelling and myalgias.   Integumentary:  Negative for pallor, rash, wound, breast mass, breast discharge and breast tenderness.   Allergic/Immunologic: Negative.  Negative for immunocompromised state.   Neurological:  Negative for dizziness, vertigo, syncope, weakness, numbness and headaches.   Hematological:  Negative for adenopathy. Does not bruise/bleed easily.   Psychiatric/Behavioral:  Negative for behavioral problems, confusion and dysphoric mood. The patient is not nervous/anxious.    All other systems reviewed and are negative.  Breast: Negative for mass and tenderness      Objective:      Vitals:    05/03/23 0859   BP: (!) 132/91   Pulse: 92   Resp: 18   Temp: 98 °F (36.7 °C)       Physical Exam  Constitutional:       General: She is not in acute distress.     Appearance: Normal appearance. She is not ill-appearing.   HENT:      Head: Normocephalic and atraumatic.      Nose: Nose normal.      Mouth/Throat:      Mouth: Mucous membranes are moist.      Pharynx: Oropharynx is clear.   Eyes:      Extraocular Movements: Extraocular movements intact.      Conjunctiva/sclera: Conjunctivae normal.      Pupils: Pupils are equal, round, and reactive to  light.   Cardiovascular:      Rate and Rhythm: Normal rate and regular rhythm.      Pulses: Normal pulses.      Heart sounds: Normal heart sounds. No murmur heard.  Pulmonary:      Effort: Pulmonary effort is normal. No respiratory distress.      Breath sounds: Normal breath sounds.   Abdominal:      General: There is no distension.      Palpations: Abdomen is soft.      Tenderness: There is no abdominal tenderness.   Musculoskeletal:         General: Normal range of motion.      Cervical back: Normal range of motion and neck supple.      Right lower leg: No edema.      Left lower leg: No edema.   Lymphadenopathy:      Cervical: No cervical adenopathy.      Comments: No adenopathy noted bilaterally.   Skin:     General: Skin is warm and dry.   Neurological:      General: No focal deficit present.      Mental Status: She is alert and oriented to person, place, and time.       LABS AND IMAGING REVIEWED IN [facility]    Pathology:  6/21/22: Joshua MMG:IMPRESSION: NEGATIVE    5/28/22 PAP Smear:  Positive- High [place] HPV  Positive- HPV 16    12/6/22 PAP Smear:  Positive- High Risk HPV  Positive- HPV 16    1/3/23 Cervical Biopsy:  Cervical Biopsy, 6 o'clock        - Squamous cell carcinoma, invasive, moderately differentiated  2.   Cervical Biopsy, 8 o'clock,         - Squamous cell carcinoma, invasive, moderately differentiated  3.   Cervical Biopsy, 10 o'clock        - Squamous cell carcinoma, invasive, moderately differentiated  4.   ECC        - Squamous cell carcinoma, invasive, moderately differentiated     Comment: Squamous cell carcinoma comprises almost the entirely of the tissue. Carcinoma involves the full thickness of these biopsies and invades at least 3 mm.    2/3/2023 PET/CT:    Hypermetabolic activity noted within the cervix. No regional or distant metastatic disease.    2/10/23 MRI Pelvis:  4.3cm diameter primary cervical mass. No extension into the parametrial fat appreciated. Small but suspicious appearing left  external iliac chain lymph nodes      Assessment:   Stage 2B SCC of Cervix   Plan for definitive concurrent chemo/XRT with weekly cisplatin 40mg weekly  Followed by Dr. Gandhi for radiation, began 04/05/2023, currently on treatment 17/25  Audiology visit completed 04/05/2023.  C1 qw cisplatin given 04/06/2023.  MediPort placed 04/10/2023 by Dr. Pollack  Current chemotherapy  Cisplatin weekly x 6 cycles with PET/CT three months once completion of 6 cycles/radiation  Currently due for c5 cisplatin today. Labs are stable and adequate to continue with treatment today.   Family history of cancer  Seen by genetics 4/24/2023 with follow-up in four weeks.   Anxiety  Stable today. Placed on Prozac by PCP.   Diarrhea   Stable today. Continue imodium and lomotil as needed.         Plan:   Labs stable.   Continue with radiation as scheduled.  FU with PCP for anxiety.   Continue with C5 of weekly cisplatin as scheduled today.  RTC in one week prior to chemo with MD/NP for FU/lab/treat      EDVIN Preston  Oncology/Hematology   [facility]

## 2023-05-04 ENCOUNTER — INFUSION (OUTPATIENT)
Dept: INFUSION THERAPY | Facility: HOSPITAL | Age: 48
End: 2023-05-04
Payer: MEDICAID

## 2023-05-04 VITALS
HEIGHT: 65 IN | SYSTOLIC BLOOD PRESSURE: 153 MMHG | OXYGEN SATURATION: 97 % | HEART RATE: 86 BPM | BODY MASS INDEX: 35.48 KG/M2 | RESPIRATION RATE: 18 BRPM | TEMPERATURE: 99 F | WEIGHT: 212.94 LBS | DIASTOLIC BLOOD PRESSURE: 99 MMHG

## 2023-05-04 DIAGNOSIS — C53.8 MALIGNANT NEOPLASM OF OVERLAPPING SITES OF CERVIX: Primary | ICD-10-CM

## 2023-05-04 PROCEDURE — 96413 CHEMO IV INFUSION 1 HR: CPT

## 2023-05-04 PROCEDURE — 25000003 PHARM REV CODE 250

## 2023-05-04 PROCEDURE — 63600175 PHARM REV CODE 636 W HCPCS

## 2023-05-04 PROCEDURE — A4216 STERILE WATER/SALINE, 10 ML: HCPCS

## 2023-05-04 PROCEDURE — 96366 THER/PROPH/DIAG IV INF ADDON: CPT

## 2023-05-04 PROCEDURE — 96361 HYDRATE IV INFUSION ADD-ON: CPT

## 2023-05-04 PROCEDURE — 77412 RADIATION TX DELIVERY LVL 3: CPT | Performed by: RADIOLOGY

## 2023-05-04 PROCEDURE — 96375 TX/PRO/DX INJ NEW DRUG ADDON: CPT

## 2023-05-04 PROCEDURE — 96367 TX/PROPH/DG ADDL SEQ IV INF: CPT

## 2023-05-04 RX ORDER — HEPARIN 100 UNIT/ML
500 SYRINGE INTRAVENOUS
Status: ACTIVE | OUTPATIENT
Start: 2023-05-04

## 2023-05-04 RX ORDER — SODIUM CHLORIDE 0.9 % (FLUSH) 0.9 %
10 SYRINGE (ML) INJECTION
Status: ACTIVE | OUTPATIENT
Start: 2023-05-04

## 2023-05-04 RX ADMIN — MAGNESIUM SULFATE HEPTAHYDRATE: 500 INJECTION, SOLUTION INTRAMUSCULAR; INTRAVENOUS at 11:05

## 2023-05-04 RX ADMIN — SODIUM CHLORIDE 1000 ML: 9 INJECTION, SOLUTION INTRAVENOUS at 09:05

## 2023-05-04 RX ADMIN — APREPITANT 130 MG: 130 INJECTION, EMULSION INTRAVENOUS at 09:05

## 2023-05-04 RX ADMIN — SODIUM CHLORIDE, PRESERVATIVE FREE 10 ML: 5 INJECTION INTRAVENOUS at 01:05

## 2023-05-04 RX ADMIN — CISPLATIN 84 MG: 1 INJECTION, SOLUTION INTRAVENOUS at 10:05

## 2023-05-04 RX ADMIN — DEXAMETHASONE SODIUM PHOSPHATE 0.25 MG: 10 INJECTION, SOLUTION INTRAMUSCULAR; INTRAVENOUS at 10:05

## 2023-05-04 RX ADMIN — HEPARIN 500 UNITS: 100 SYRINGE at 01:05

## 2023-05-05 PROCEDURE — 77412 RADIATION TX DELIVERY LVL 3: CPT | Performed by: RADIOLOGY

## 2023-05-08 PROCEDURE — 77412 RADIATION TX DELIVERY LVL 3: CPT | Performed by: RADIOLOGY

## 2023-05-08 PROCEDURE — 77336 RADIATION PHYSICS CONSULT: CPT | Performed by: RADIOLOGY

## 2023-05-09 PROCEDURE — 77334 RADIATION TREATMENT AID(S): CPT | Performed by: RADIOLOGY

## 2023-05-09 PROCEDURE — 77412 RADIATION TX DELIVERY LVL 3: CPT | Performed by: RADIOLOGY

## 2023-05-09 PROCEDURE — 77300 RADIATION THERAPY DOSE PLAN: CPT | Performed by: RADIOLOGY

## 2023-05-10 ENCOUNTER — OFFICE VISIT (OUTPATIENT)
Dept: HEMATOLOGY/ONCOLOGY | Facility: CLINIC | Age: 48
End: 2023-05-10
Payer: MEDICAID

## 2023-05-10 VITALS
RESPIRATION RATE: 20 BRPM | HEART RATE: 102 BPM | HEIGHT: 65 IN | WEIGHT: 213.63 LBS | BODY MASS INDEX: 35.59 KG/M2 | TEMPERATURE: 98 F | DIASTOLIC BLOOD PRESSURE: 91 MMHG | SYSTOLIC BLOOD PRESSURE: 137 MMHG | OXYGEN SATURATION: 96 %

## 2023-05-10 DIAGNOSIS — Z51.11 ENCOUNTER FOR CHEMOTHERAPY MANAGEMENT: ICD-10-CM

## 2023-05-10 DIAGNOSIS — R19.7 DIARRHEA, UNSPECIFIED TYPE: ICD-10-CM

## 2023-05-10 DIAGNOSIS — C53.8 MALIGNANT NEOPLASM OF OVERLAPPING SITES OF CERVIX: ICD-10-CM

## 2023-05-10 DIAGNOSIS — C53.9 MALIGNANT NEOPLASM OF CERVIX, UNSPECIFIED SITE: Primary | ICD-10-CM

## 2023-05-10 DIAGNOSIS — Z80.41 FAMILY HISTORY OF MALIGNANT NEOPLASM OF OVARY: ICD-10-CM

## 2023-05-10 PROCEDURE — 99215 PR OFFICE/OUTPT VISIT, EST, LEVL V, 40-54 MIN: ICD-10-PCS | Mod: S$PBB,,,

## 2023-05-10 PROCEDURE — 99999 PR PBB SHADOW E&M-EST. PATIENT-LVL III: ICD-10-PCS | Mod: PBBFAC,,,

## 2023-05-10 PROCEDURE — 1160F RVW MEDS BY RX/DR IN RCRD: CPT | Mod: CPTII,,,

## 2023-05-10 PROCEDURE — 3008F PR BODY MASS INDEX (BMI) DOCUMENTED: ICD-10-PCS | Mod: CPTII,,,

## 2023-05-10 PROCEDURE — 1160F PR REVIEW ALL MEDS BY PRESCRIBER/CLIN PHARMACIST DOCUMENTED: ICD-10-PCS | Mod: CPTII,,,

## 2023-05-10 PROCEDURE — 99213 OFFICE O/P EST LOW 20 MIN: CPT | Mod: PBBFAC

## 2023-05-10 PROCEDURE — 1159F PR MEDICATION LIST DOCUMENTED IN MEDICAL RECORD: ICD-10-PCS | Mod: CPTII,,,

## 2023-05-10 PROCEDURE — 99215 OFFICE O/P EST HI 40 MIN: CPT | Mod: S$PBB,,,

## 2023-05-10 PROCEDURE — 3080F PR MOST RECENT DIASTOLIC BLOOD PRESSURE >= 90 MM HG: ICD-10-PCS | Mod: CPTII,,,

## 2023-05-10 PROCEDURE — 99999 PR PBB SHADOW E&M-EST. PATIENT-LVL III: CPT | Mod: PBBFAC,,,

## 2023-05-10 PROCEDURE — 3075F PR MOST RECENT SYSTOLIC BLOOD PRESS GE 130-139MM HG: ICD-10-PCS | Mod: CPTII,,,

## 2023-05-10 PROCEDURE — 3075F SYST BP GE 130 - 139MM HG: CPT | Mod: CPTII,,,

## 2023-05-10 PROCEDURE — 3080F DIAST BP >= 90 MM HG: CPT | Mod: CPTII,,,

## 2023-05-10 PROCEDURE — 3008F BODY MASS INDEX DOCD: CPT | Mod: CPTII,,,

## 2023-05-10 PROCEDURE — 1159F MED LIST DOCD IN RCRD: CPT | Mod: CPTII,,,

## 2023-05-10 RX ORDER — DIAZEPAM 10 MG/1
10 TABLET ORAL
COMMUNITY

## 2023-05-10 RX ORDER — GABAPENTIN 300 MG/1
CAPSULE ORAL
COMMUNITY
Start: 2023-05-08

## 2023-05-10 RX ORDER — PHENAZOPYRIDINE HYDROCHLORIDE 200 MG/1
200 TABLET, FILM COATED ORAL 2 TIMES DAILY PRN
COMMUNITY

## 2023-05-10 RX ORDER — SODIUM CHLORIDE 0.9 % (FLUSH) 0.9 %
10 SYRINGE (ML) INJECTION
Status: CANCELLED | OUTPATIENT
Start: 2023-05-11

## 2023-05-10 RX ORDER — HEPARIN 100 UNIT/ML
500 SYRINGE INTRAVENOUS
Status: CANCELLED | OUTPATIENT
Start: 2023-05-11

## 2023-05-10 RX ORDER — HYDROCODONE BITARTRATE AND ACETAMINOPHEN 10; 325 MG/1; MG/1
TABLET ORAL
COMMUNITY
Start: 2023-05-08

## 2023-05-10 NOTE — PROGRESS NOTES
Subjective:       Patient ID: Lesley Estrada is a 47 y.o. female.    Chief Complaint: Cervical Cancer (F/u with labs-- Patient reports no new concerns )    Diagnosis: Cervical Cancer Stage 2B     Current Treatment:   Cisplatin 40 mg q.week x6 cycles, rescanned with PET/CT three-month once completion of 6 cycles/radiation    Treatment History: None      HPI  47 yr old who presents for evaluation of cervical cancer in March 2023. Upon initial diagnosis in January, she had the option of definitive chemo/XRT vs total hysterectomy and decided to proceed with surgery with Dr. Rhodes. Unfortunately at the time of surgery, an exam was done that revealed obvious tumor extension onto the anterior of the vagina with some possible involvement of the left lateral vaginal apex. The tumor itself was thought to be approximately 5cm at the time of bimanual exam on 3/20. Given these new progressive findings it was decided that patient would be a better candidate for curative intent if she underwent concurrent chemo/RT. She has met with Dr. Gandhi and was referred to oncology for discussion of concurrent chemotherapy.     Today patient is accompanied by her two daughters. She has some interrmitent cervical bleeding and pain. Otherwise she has no complaints.     Family history includes a mother who had breast cancer at the age of 57 and her grandmother had ovarian cancer in her 40-50s and then developed breast cancer in her 90s as well as several other maternal family members with different types of malignancies.     I discussed her Diagnosis, prognosis, and recommended treatment options. I included NCCN guideline recommendations. I discussed the general toxicities of chemotherapy as well as specific concerns with cisplatin including ototoxicity, renal dysfunction, and neuropathy. She is willing to proceed.     Interval History:  She returns to the clinic today for a one-week after receiving C5 of cisplatin and for treatment  clearance for cycle 6 cisplatin scheduled tomorrow. Overall, she continues to tolerate chemotherapy well with minimal side effects.  She completed external radiation 05/09/2023.  Per her report, MRI performed by Dr. Gandhi showed improvement, but she will be proceeding with 5 treatments of prophylactic internal beam therapy  with her 1st treatment due today.  Anxiety is currently controlled.  Diarrhea occurs occasionally but is controlled with medications.  She denies any neuropathy, shortness of breath, chest pain, pain, fatigue, recent hospitalizations or illnesses.  She denies any changes in her hearing, dizziness, ringing in ears.      Past Medical History:   Diagnosis Date    ADD (attention deficit disorder)     Cancer 2023    Cervical Cancer    Essential (primary) hypertension     Generalized anxiety disorder       Past Surgical History:   Procedure Laterality Date    Arm surgery Left     HAD STAPH INFECTION  IN ARM AND WAS DRAINED    PLACEMENT, MEDIPORT N/A 4/10/2023    Procedure: Placement, Mediport;  Surgeon: Alexus Pollack MD;  Location: [facility];  Service: General;  Laterality: N/A;     Social History     Socioeconomic History    Marital status:    Tobacco Use    Smoking status: Never    Smokeless tobacco: Never   Substance and Sexual Activity    Alcohol use: Yes     Comment: 4 X WEEK    Drug use: Never    Sexual activity: Yes     Partners: Male      Family History   Problem Relation Age of Onset    Breast cancer Mother 56    Other Father         Had stints in his heart    Ovarian cancer Maternal Grandmother         50s    Breast cancer Maternal Grandmother         late 80s    Stomach cancer Maternal Grandfather         late 60s, early 70s    Kidney failure Paternal Grandmother     Diabetes Paternal Grandmother     Suicide Paternal Grandfather     Breast cancer Paternal Aunt 75      Review of patient's allergies indicates:  No Known Allergies       Review of Systems   Constitutional:  Negative  for activity change, appetite change, chills, fatigue, fever and unexpected weight change.   HENT:  Negative for facial swelling, mouth sores, nosebleeds, sinus pressure/congestion and sore throat.    Eyes:  Negative for photophobia, pain and visual disturbance.   Respiratory:  Negative for cough, chest tightness, shortness of breath and wheezing.    Cardiovascular:  Negative for chest pain, palpitations and leg swelling.   Gastrointestinal:  Positive for change in bowel habit, diarrhea and change in bowel habit. Negative for abdominal pain, blood in stool, constipation, nausea and vomiting.   Endocrine: Negative.  Negative for polyuria.   Genitourinary:  Negative for dysuria, frequency, hematuria, hot flashes, pelvic pain, urgency and vaginal pain.   Musculoskeletal:  Negative for arthralgias, gait problem, joint swelling and myalgias.   Integumentary:  Negative for pallor, rash, wound, breast mass, breast discharge and breast tenderness.   Allergic/Immunologic: Negative.  Negative for immunocompromised state.   Neurological:  Negative for dizziness, vertigo, syncope, weakness, numbness and headaches.   Hematological:  Negative for adenopathy. Does not bruise/bleed easily.   Psychiatric/Behavioral:  Negative for behavioral problems, confusion and dysphoric mood. The patient is not nervous/anxious.    All other systems reviewed and are negative.  Breast: Negative for mass and tenderness      Objective:      Vitals:    05/10/23 1056   BP: (!) 137/91   Pulse: 102   Resp: 20   Temp: 98.1 °F (36.7 °C)       Physical Exam  Constitutional:       General: She is not in acute distress.     Appearance: Normal appearance. She is not ill-appearing.   HENT:      Head: Normocephalic and atraumatic.      Nose: Nose normal.      Mouth/Throat:      Mouth: Mucous membranes are moist.      Pharynx: Oropharynx is clear.   Eyes:      Extraocular Movements: Extraocular movements intact.      Conjunctiva/sclera: Conjunctivae normal.       Pupils: Pupils are equal, round, and reactive to light.   Cardiovascular:      Rate and Rhythm: Normal rate and regular rhythm.      Pulses: Normal pulses.      Heart sounds: Normal heart sounds. No murmur heard.  Pulmonary:      Effort: Pulmonary effort is normal. No respiratory distress.      Breath sounds: Normal breath sounds.   Abdominal:      General: There is no distension.      Palpations: Abdomen is soft.      Tenderness: There is no abdominal tenderness.   Musculoskeletal:         General: Normal range of motion.      Cervical back: Normal range of motion and neck supple.      Right lower leg: No edema.      Left lower leg: No edema.   Lymphadenopathy:      Cervical: No cervical adenopathy.      Comments: No adenopathy noted bilaterally.   Skin:     General: Skin is warm and dry.   Neurological:      General: No focal deficit present.      Mental Status: She is alert and oriented to person, place, and time.       LABS AND IMAGING REVIEWED IN [facility]    Pathology:  6/21/22: Joshua MMG:IMPRESSION: NEGATIVE    5/28/22 PAP Smear:  Positive- High Donell HPV  Positive- HPV 16    12/6/22 PAP Smear:  Positive- High Risk HPV  Positive- HPV 16    1/3/23 Cervical Biopsy:  Cervical Biopsy, 6 o'clock        - Squamous cell carcinoma, invasive, moderately differentiated  2.   Cervical Biopsy, 8 o'clock,         - Squamous cell carcinoma, invasive, moderately differentiated  3.   Cervical Biopsy, 10 o'clock        - Squamous cell carcinoma, invasive, moderately differentiated  4.   ECC        - Squamous cell carcinoma, invasive, moderately differentiated     Comment: Squamous cell carcinoma comprises almost the entirely of the tissue. Carcinoma involves the full thickness of these biopsies and invades at least 3 mm.    2/3/2023 PET/CT:    Hypermetabolic activity noted within the cervix. No regional or distant metastatic disease.    2/10/23 MRI Pelvis:  4.3cm diameter primary cervical mass. No extension into the parametrial fat  appreciated. Small but suspicious appearing left external iliac chain lymph nodes      Assessment:   Stage 2B SCC of Cervix   Plan for definitive concurrent chemo/XRT with weekly cisplatin 40mg weekly  Followed by Dr. Gandhi for radiation. She completed 25 treatments of external radiation 04/05/2023-5/9/2023 and will be beginning 1/5 internal beam radiation today.   Audiology visit completed 04/05/2023.  C1 qw cisplatin given 04/06/2023.  MediPort placed 04/10/2023 by Dr. Pollack  Current chemotherapy  Cisplatin weekly x 6 cycles with PET/CT three months once completion of 6 cycles/radiation  Currently due for c6 cisplatin today. Labs are stable and adequate to continue with treatment today.   Family history of cancer  Seen by genetics 4/24/2023 with follow-up in four weeks.   Anxiety  Stable today. Placed on Prozac by PCP.   Diarrhea   Stable today. Continue imodium and lomotil as needed.         Plan:   Labs stable.   Continue with radiation as scheduled.  FU with PCP for anxiety.   Continue with C6 of weekly cisplatin as scheduled today.  RTC in three weeks with MD for FU/lab      ANTONINO PrestonP-C  Oncology/Hematology   [facility]

## 2023-05-11 ENCOUNTER — INFUSION (OUTPATIENT)
Dept: INFUSION THERAPY | Facility: HOSPITAL | Age: 48
End: 2023-05-11
Attending: NURSE PRACTITIONER
Payer: MEDICAID

## 2023-05-11 VITALS
HEART RATE: 93 BPM | BODY MASS INDEX: 35.65 KG/M2 | OXYGEN SATURATION: 97 % | HEIGHT: 65 IN | RESPIRATION RATE: 18 BRPM | SYSTOLIC BLOOD PRESSURE: 147 MMHG | TEMPERATURE: 98 F | WEIGHT: 214 LBS | DIASTOLIC BLOOD PRESSURE: 93 MMHG

## 2023-05-11 DIAGNOSIS — C53.8 MALIGNANT NEOPLASM OF OVERLAPPING SITES OF CERVIX: Primary | ICD-10-CM

## 2023-05-11 PROCEDURE — A4216 STERILE WATER/SALINE, 10 ML: HCPCS

## 2023-05-11 PROCEDURE — 96366 THER/PROPH/DIAG IV INF ADDON: CPT

## 2023-05-11 PROCEDURE — 96413 CHEMO IV INFUSION 1 HR: CPT

## 2023-05-11 PROCEDURE — 96375 TX/PRO/DX INJ NEW DRUG ADDON: CPT

## 2023-05-11 PROCEDURE — 63600175 PHARM REV CODE 636 W HCPCS

## 2023-05-11 PROCEDURE — 96361 HYDRATE IV INFUSION ADD-ON: CPT

## 2023-05-11 PROCEDURE — 96367 TX/PROPH/DG ADDL SEQ IV INF: CPT

## 2023-05-11 PROCEDURE — 25000003 PHARM REV CODE 250

## 2023-05-11 RX ORDER — HEPARIN 100 UNIT/ML
500 SYRINGE INTRAVENOUS
Status: ACTIVE | OUTPATIENT
Start: 2023-05-11

## 2023-05-11 RX ORDER — SODIUM CHLORIDE 0.9 % (FLUSH) 0.9 %
10 SYRINGE (ML) INJECTION
Status: ACTIVE | OUTPATIENT
Start: 2023-05-11

## 2023-05-11 RX ADMIN — HEPARIN 500 UNITS: 100 SYRINGE at 01:05

## 2023-05-11 RX ADMIN — MAGNESIUM SULFATE HEPTAHYDRATE: 500 INJECTION, SOLUTION INTRAMUSCULAR; INTRAVENOUS at 11:05

## 2023-05-11 RX ADMIN — CISPLATIN 84 MG: 1 INJECTION, SOLUTION INTRAVENOUS at 10:05

## 2023-05-11 RX ADMIN — APREPITANT 130 MG: 130 INJECTION, EMULSION INTRAVENOUS at 09:05

## 2023-05-11 RX ADMIN — SODIUM CHLORIDE 1000 ML: 9 INJECTION, SOLUTION INTRAVENOUS at 09:05

## 2023-05-11 RX ADMIN — DEXAMETHASONE SODIUM PHOSPHATE 0.25 MG: 10 INJECTION, SOLUTION INTRAMUSCULAR; INTRAVENOUS at 09:05

## 2023-05-11 RX ADMIN — SODIUM CHLORIDE, PRESERVATIVE FREE 10 ML: 5 INJECTION INTRAVENOUS at 01:05

## 2023-05-17 PROCEDURE — 77317 BRACHYTX ISODOSE INTERMED: CPT | Performed by: RADIOLOGY

## 2023-05-17 PROCEDURE — 77332 RADIATION TREATMENT AID(S): CPT | Performed by: RADIOLOGY

## 2023-05-17 PROCEDURE — 77290 THER RAD SIMULAJ FIELD CPLX: CPT | Performed by: RADIOLOGY

## 2023-05-17 PROCEDURE — 77771 HDR RDNCL NTRSTL/ICAV BRCHTX: CPT | Performed by: RADIOLOGY

## 2023-05-22 ENCOUNTER — OFFICE VISIT (OUTPATIENT)
Dept: HEMATOLOGY/ONCOLOGY | Facility: CLINIC | Age: 48
End: 2023-05-22
Payer: MEDICAID

## 2023-05-22 DIAGNOSIS — C53.9 MALIGNANT NEOPLASM OF CERVIX, UNSPECIFIED SITE: Primary | ICD-10-CM

## 2023-05-22 DIAGNOSIS — Z80.41 FAMILY HISTORY OF MALIGNANT NEOPLASM OF OVARY: ICD-10-CM

## 2023-05-22 DIAGNOSIS — Z80.3 FAMILY HISTORY OF BREAST CANCER: ICD-10-CM

## 2023-05-22 PROCEDURE — 77317 BRACHYTX ISODOSE INTERMED: CPT | Performed by: RADIOLOGY

## 2023-05-22 PROCEDURE — 1159F PR MEDICATION LIST DOCUMENTED IN MEDICAL RECORD: ICD-10-PCS | Mod: CPTII,95,, | Performed by: NURSE PRACTITIONER

## 2023-05-22 PROCEDURE — 1159F MED LIST DOCD IN RCRD: CPT | Mod: CPTII,95,, | Performed by: NURSE PRACTITIONER

## 2023-05-22 PROCEDURE — 99213 PR OFFICE/OUTPT VISIT, EST, LEVL III, 20-29 MIN: ICD-10-PCS | Mod: 95,,, | Performed by: NURSE PRACTITIONER

## 2023-05-22 PROCEDURE — 77771 HDR RDNCL NTRSTL/ICAV BRCHTX: CPT | Performed by: RADIOLOGY

## 2023-05-22 PROCEDURE — 77290 THER RAD SIMULAJ FIELD CPLX: CPT | Performed by: RADIOLOGY

## 2023-05-22 PROCEDURE — 99213 OFFICE O/P EST LOW 20 MIN: CPT | Mod: 95,,, | Performed by: NURSE PRACTITIONER

## 2023-05-22 NOTE — PROGRESS NOTES
REFERRING PHYSICIAN:    Subjective:       Patient ID: Lesley Estrada is a 47 y.o. female.    Chief Complaint: Personal history of cervical cancer dx47y and a family history of cancer. Patient, her sister Angela Carlson  66, and daughter, Shilo Centeno  10/18/94 presented for genetic counseling on 2023 and was found appropriate for genetic testing. She signed consent, lab was drawn and sent to [facility] for BRCA1/2 Analyses with CancerNext-Expanded+RNAinsight panel testing. They presented via Telemedicine Visit (audio and video) today for results disclosure.     HPI  Past Medical History:   Diagnosis Date    ADD (attention deficit disorder)     Cancer     Cervical Cancer    Essential (primary) hypertension     Generalized anxiety disorder       Review of patient's allergies indicates:  No Known Allergies   Review of Systems          Problem List Items Addressed This Visit    None    Oncology History   Malignant neoplasm of overlapping sites of cervix   3/30/2023 Initial Diagnosis    Malignant neoplasm of overlapping sites of cervix       2023 -  Chemotherapy    Treatment Summary   Plan Name: OP GYN CISPLATIN WEEKLY  Treatment Goal: Curative  Status: Active  Start Date: 2023  End Date: 2023  Provider: Elizabeth Kennedy Lejeune, MD  Chemotherapy: CISplatin (Platinol) 85 mg in sodium chloride 0.9% 650 mL chemo infusion, 84 mg, Intravenous, Clinic/HOD 1 time, 6 of 6 cycles  Administration: 85 mg (2023), 84 mg (2023), 84 mg (2023), 84 mg (2023), 84 mg (2023), 84 mg (2023)              Family History   Problem Relation Age of Onset    Breast cancer Mother 56    Other Father         Had stints in his heart    Ovarian cancer Maternal Grandmother         50s    Breast cancer Maternal Grandmother         late 80s    Stomach cancer Maternal Grandfather         late 60s, early 70s    Kidney failure Paternal Grandmother     Diabetes Paternal Grandmother      Suicide Paternal Grandfather     Breast cancer Paternal Aunt 75        Assessment:     Genetic testing was appropriate for this patient because of her family history. 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 This mutation is located in coding exon 2 of the NTHL1 gene and results from a C to T substitution at nucleotide position 268. 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 Homozygous NTHL1 mutations are associated with an autosomal recessive disorder that increases the risk of colon polyposis and colon cancer. There is no known risk to NTHL1 carriers (heterozygous).    Each first degree relative of this individual has a 50% risk of having this same mutation. Parents who are both NTHL1 mutation carriers have a 25% risk of having a child with autosomal recessive NTHL1 associated colon polyposis and colon cancer risk. Family members can be tested using specific site analysis.         The patient location is: [address] in [place], [place]    Visit type: audiovisual    Face to Face time with patient: 10 minutes  20 minutes of total time spent on the encounter, which includes face to face time and non-face to face time preparing to see the patient (eg, review of tests), Obtaining and/or reviewing separately obtained history, Documenting clinical information in the electronic or other health record, Independently interpreting results (not separately reported) and communicating results to the patient/family/caregiver, or Care coordination (not separately reported).         Each patient to whom he or she provides medical services by telemedicine is:  (1) informed of the relationship between the physician and patient and the respective role of any other health care provider with respect to management of the patient; and (2) notified that he  or she may decline to receive medical services by telemedicine and may withdraw from such care at any time.    YAIMA JOSE, PhD

## 2023-05-24 PROCEDURE — 77771 HDR RDNCL NTRSTL/ICAV BRCHTX: CPT | Performed by: RADIOLOGY

## 2023-05-24 PROCEDURE — 77317 BRACHYTX ISODOSE INTERMED: CPT | Performed by: RADIOLOGY

## 2023-05-24 PROCEDURE — 77290 THER RAD SIMULAJ FIELD CPLX: CPT | Performed by: RADIOLOGY

## 2023-05-29 PROCEDURE — 77771 HDR RDNCL NTRSTL/ICAV BRCHTX: CPT | Performed by: RADIOLOGY

## 2023-05-29 PROCEDURE — 77317 BRACHYTX ISODOSE INTERMED: CPT | Performed by: RADIOLOGY

## 2023-05-29 PROCEDURE — 77290 THER RAD SIMULAJ FIELD CPLX: CPT | Performed by: RADIOLOGY

## 2023-05-31 PROCEDURE — 77370 RADIATION PHYSICS CONSULT: CPT | Performed by: RADIOLOGY

## 2023-05-31 PROCEDURE — 77771 HDR RDNCL NTRSTL/ICAV BRCHTX: CPT | Performed by: RADIOLOGY

## 2023-05-31 PROCEDURE — 77317 BRACHYTX ISODOSE INTERMED: CPT | Performed by: RADIOLOGY

## 2023-05-31 PROCEDURE — 77290 THER RAD SIMULAJ FIELD CPLX: CPT | Performed by: RADIOLOGY

## 2023-05-31 NOTE — PROGRESS NOTES
Subjective:       Patient ID: Lesley Estrada is a 47 y.o. female.    Chief Complaint: Cervical Cancer (F/u with labs-- Patient reports no new concerns )    Diagnosis: Cervical Cancer Stage 2B     Current Treatment: None    Treatment History:   Concurrent chemo/RT with Cisplatin 40 mg q.week x6 cycles, rescanned with PET/CT three-month once completion of 6 cycles/radiation    HPI  47 yr old who presents for evaluation of cervical cancer in March 2023. Upon initial diagnosis in January, she had the option of definitive chemo/XRT vs total hysterectomy and decided to proceed with surgery with Dr. Rhodes. Unfortunately at the time of surgery, an exam was done that revealed obvious tumor extension onto the anterior of the vagina with some possible involvement of the left lateral vaginal apex. The tumor itself was thought to be approximately 5cm at the time of bimanual exam on 3/20. Given these new progressive findings it was decided that patient would be a better candidate for curative intent if she underwent concurrent chemo/RT. She underwent concurrent chemo RT with weekly cisplatin 40mg in April - May '23. She tolerated therapy well. Follow up MRI of the pelvis with complete resolution of cervical mass and LAD. She is now under surveillance until repeat PET 3-4 months after radiation.     Interval History:  Patient presents after recent completion of her chemo and radiation.   She is doing extremely well. Good energy. Feels her usual self.   Has a recurrent fever blister. Otherwise no complaints.   She denies any neuropathy, shortness of breath, chest pain, pain, fatigue, recent hospitalizations or illnesses.    She denies any changes in her hearing, dizziness, ringing in ears.      Past Medical History:   Diagnosis Date    ADD (attention deficit disorder)     Cancer 2023    Cervical Cancer    Essential (primary) hypertension     Generalized anxiety disorder       Past Surgical History:   Procedure Laterality Date     Arm surgery Left     HAD STAPH INFECTION  IN ARM AND WAS DRAINED    PLACEMENT, MEDIPORT N/A 4/10/2023    Procedure: Placement, Mediport;  Surgeon: Alexus Pollack MD;  Location: [facility] OR;  Service: General;  Laterality: N/A;     Social History     Socioeconomic History    Marital status:    Tobacco Use    Smoking status: Never    Smokeless tobacco: Never   Substance and Sexual Activity    Alcohol use: Yes     Comment: 4 X WEEK    Drug use: Never    Sexual activity: Yes     Partners: Male      Family History   Problem Relation Age of Onset    Breast cancer Mother 56    Other Father         Had stints in his heart    Ovarian cancer Maternal Grandmother         50s    Breast cancer Maternal Grandmother         late 80s    Stomach cancer Maternal Grandfather         late 60s, early 70s    Kidney failure Paternal Grandmother     Diabetes Paternal Grandmother     Suicide Paternal Grandfather     Breast cancer Paternal Aunt 75      Review of patient's allergies indicates:  No Known Allergies       Review of Systems   Constitutional:  Negative for activity change, appetite change, chills, fatigue, fever and unexpected weight change.   HENT:  Negative for facial swelling, mouth sores, nosebleeds, sinus pressure/congestion and sore throat.    Eyes:  Negative for photophobia, pain and visual disturbance.   Respiratory:  Negative for cough, chest tightness, shortness of breath and wheezing.    Cardiovascular:  Negative for chest pain, palpitations and leg swelling.   Gastrointestinal:  Positive for change in bowel habit, diarrhea and change in bowel habit. Negative for abdominal pain, blood in stool, constipation, nausea and vomiting.   Endocrine: Negative.  Negative for polyuria.   Genitourinary:  Negative for dysuria, frequency, hematuria, hot flashes, pelvic pain, urgency and vaginal pain.   Musculoskeletal:  Negative for arthralgias, gait problem, joint swelling and myalgias.   Integumentary:  Negative for  pallor, rash, wound, breast mass, breast discharge and breast tenderness.   Allergic/Immunologic: Negative.  Negative for immunocompromised state.   Neurological:  Negative for dizziness, vertigo, syncope, weakness, numbness and headaches.   Hematological:  Negative for adenopathy. Does not bruise/bleed easily.   Psychiatric/Behavioral:  Negative for behavioral problems, confusion and dysphoric mood. The patient is not nervous/anxious.    All other systems reviewed and are negative.  Breast: Negative for mass and tenderness      Objective:      Vitals:    06/01/23 0921   BP: (!) 146/90   Pulse: 98   Resp: 20   Temp: 98.4 °F (36.9 °C)         Physical Exam  Constitutional:       General: She is not in acute distress.     Appearance: Normal appearance. She is not ill-appearing.   HENT:      Head: Normocephalic and atraumatic.      Nose: Nose normal.      Mouth/Throat:      Mouth: Mucous membranes are moist.      Pharynx: Oropharynx is clear.   Eyes:      Extraocular Movements: Extraocular movements intact.      Conjunctiva/sclera: Conjunctivae normal.      Pupils: Pupils are equal, round, and reactive to light.   Cardiovascular:      Rate and Rhythm: Normal rate and regular rhythm.      Pulses: Normal pulses.      Heart sounds: Normal heart sounds. No murmur heard.  Pulmonary:      Effort: Pulmonary effort is normal. No respiratory distress.      Breath sounds: Normal breath sounds.   Abdominal:      General: There is no distension.      Palpations: Abdomen is soft.      Tenderness: There is no abdominal tenderness.   Musculoskeletal:         General: Normal range of motion.      Cervical back: Normal range of motion and neck supple.      Right lower leg: No edema.      Left lower leg: No edema.   Lymphadenopathy:      Cervical: No cervical adenopathy.      Comments: No adenopathy noted bilaterally.   Skin:     General: Skin is warm and dry.   Neurological:      General: No focal deficit present.      Mental Status:  She is alert and oriented to person, place, and time.       LABS AND IMAGING REVIEWED IN [facility]    Pathology:  6/21/22: Joshua MMG:IMPRESSION: NEGATIVE    5/28/22 PAP Smear:  Positive- High Donell HPV  Positive- HPV 16    12/6/22 PAP Smear:  Positive- High Risk HPV  Positive- HPV 16    1/3/23 Cervical Biopsy:  Cervical Biopsy, 6 o'clock        - Squamous cell carcinoma, invasive, moderately differentiated  2.   Cervical Biopsy, 8 o'clock,         - Squamous cell carcinoma, invasive, moderately differentiated  3.   Cervical Biopsy, 10 o'clock        - Squamous cell carcinoma, invasive, moderately differentiated  4.   ECC        - Squamous cell carcinoma, invasive, moderately differentiated     Comment: Squamous cell carcinoma comprises almost the entirely of the tissue. Carcinoma involves the full thickness of these biopsies and invades at least 3 mm.    2/3/2023 PET/CT:    Hypermetabolic activity noted within the cervix. No regional or distant metastatic disease.    2/10/23 MRI Pelvis:  4.3cm diameter primary cervical mass. No extension into the parametrial fat appreciated. Small but suspicious appearing left external iliac chain lymph nodes      Assessment:   Stage 2B SCC of Cervix    Family history of cancer - Seen by Genetics in April '23        Plan:   - Toxicity reviewed, completed chemotherapy and radiation well  - Valacyclovir 2gm BID x 1 day for fever blister Rx sent  - RTC 4-5 weeks for lab check with NP to ensure improvement in CBC post therapy  - If labs are stable/improved can RTC after her PET scan with radiation (likely 2-3 months out)      Marielena Shannon, ANTONINOP-C  Oncology/Hematology   [facility]

## 2023-06-01 ENCOUNTER — OFFICE VISIT (OUTPATIENT)
Dept: HEMATOLOGY/ONCOLOGY | Facility: CLINIC | Age: 48
End: 2023-06-01
Payer: MEDICAID

## 2023-06-01 VITALS
DIASTOLIC BLOOD PRESSURE: 90 MMHG | OXYGEN SATURATION: 98 % | TEMPERATURE: 98 F | BODY MASS INDEX: 35.72 KG/M2 | HEIGHT: 65 IN | HEART RATE: 98 BPM | WEIGHT: 214.38 LBS | SYSTOLIC BLOOD PRESSURE: 146 MMHG | RESPIRATION RATE: 20 BRPM

## 2023-06-01 DIAGNOSIS — C53.9 MALIGNANT NEOPLASM OF CERVIX, UNSPECIFIED SITE: Primary | ICD-10-CM

## 2023-06-01 DIAGNOSIS — B00.1 FEVER BLISTER: Primary | ICD-10-CM

## 2023-06-01 DIAGNOSIS — C53.8 MALIGNANT NEOPLASM OF OVERLAPPING SITES OF CERVIX: ICD-10-CM

## 2023-06-01 PROCEDURE — 3008F BODY MASS INDEX DOCD: CPT | Mod: CPTII,,, | Performed by: STUDENT IN AN ORGANIZED HEALTH CARE EDUCATION/TRAINING PROGRAM

## 2023-06-01 PROCEDURE — 3077F SYST BP >= 140 MM HG: CPT | Mod: CPTII,,, | Performed by: STUDENT IN AN ORGANIZED HEALTH CARE EDUCATION/TRAINING PROGRAM

## 2023-06-01 PROCEDURE — 99999 PR PBB SHADOW E&M-EST. PATIENT-LVL III: CPT | Mod: PBBFAC,,, | Performed by: STUDENT IN AN ORGANIZED HEALTH CARE EDUCATION/TRAINING PROGRAM

## 2023-06-01 PROCEDURE — 3080F DIAST BP >= 90 MM HG: CPT | Mod: CPTII,,, | Performed by: STUDENT IN AN ORGANIZED HEALTH CARE EDUCATION/TRAINING PROGRAM

## 2023-06-01 PROCEDURE — 3008F PR BODY MASS INDEX (BMI) DOCUMENTED: ICD-10-PCS | Mod: CPTII,,, | Performed by: STUDENT IN AN ORGANIZED HEALTH CARE EDUCATION/TRAINING PROGRAM

## 2023-06-01 PROCEDURE — 1160F PR REVIEW ALL MEDS BY PRESCRIBER/CLIN PHARMACIST DOCUMENTED: ICD-10-PCS | Mod: CPTII,,, | Performed by: STUDENT IN AN ORGANIZED HEALTH CARE EDUCATION/TRAINING PROGRAM

## 2023-06-01 PROCEDURE — 3080F PR MOST RECENT DIASTOLIC BLOOD PRESSURE >= 90 MM HG: ICD-10-PCS | Mod: CPTII,,, | Performed by: STUDENT IN AN ORGANIZED HEALTH CARE EDUCATION/TRAINING PROGRAM

## 2023-06-01 PROCEDURE — 99213 OFFICE O/P EST LOW 20 MIN: CPT | Mod: PBBFAC | Performed by: STUDENT IN AN ORGANIZED HEALTH CARE EDUCATION/TRAINING PROGRAM

## 2023-06-01 PROCEDURE — 1159F PR MEDICATION LIST DOCUMENTED IN MEDICAL RECORD: ICD-10-PCS | Mod: CPTII,,, | Performed by: STUDENT IN AN ORGANIZED HEALTH CARE EDUCATION/TRAINING PROGRAM

## 2023-06-01 PROCEDURE — 99999 PR PBB SHADOW E&M-EST. PATIENT-LVL III: ICD-10-PCS | Mod: PBBFAC,,, | Performed by: STUDENT IN AN ORGANIZED HEALTH CARE EDUCATION/TRAINING PROGRAM

## 2023-06-01 PROCEDURE — 1159F MED LIST DOCD IN RCRD: CPT | Mod: CPTII,,, | Performed by: STUDENT IN AN ORGANIZED HEALTH CARE EDUCATION/TRAINING PROGRAM

## 2023-06-01 PROCEDURE — 99215 OFFICE O/P EST HI 40 MIN: CPT | Mod: S$PBB,,, | Performed by: STUDENT IN AN ORGANIZED HEALTH CARE EDUCATION/TRAINING PROGRAM

## 2023-06-01 PROCEDURE — 1160F RVW MEDS BY RX/DR IN RCRD: CPT | Mod: CPTII,,, | Performed by: STUDENT IN AN ORGANIZED HEALTH CARE EDUCATION/TRAINING PROGRAM

## 2023-06-01 PROCEDURE — 99215 PR OFFICE/OUTPT VISIT, EST, LEVL V, 40-54 MIN: ICD-10-PCS | Mod: S$PBB,,, | Performed by: STUDENT IN AN ORGANIZED HEALTH CARE EDUCATION/TRAINING PROGRAM

## 2023-06-01 PROCEDURE — 3077F PR MOST RECENT SYSTOLIC BLOOD PRESSURE >= 140 MM HG: ICD-10-PCS | Mod: CPTII,,, | Performed by: STUDENT IN AN ORGANIZED HEALTH CARE EDUCATION/TRAINING PROGRAM

## 2023-06-01 RX ORDER — VALACYCLOVIR HYDROCHLORIDE 1 G/1
2000 TABLET, FILM COATED ORAL 2 TIMES DAILY
Qty: 4 TABLET | Refills: 0 | Status: SHIPPED | OUTPATIENT
Start: 2023-06-01 | End: 2023-06-02

## 2023-06-06 ENCOUNTER — TELEPHONE (OUTPATIENT)
Dept: GYNECOLOGIC ONCOLOGY | Facility: CLINIC | Age: 48
End: 2023-06-06

## 2023-06-06 NOTE — NURSING
New pt referral received from Dr Gandhi for pt to be seen with GYN/ONC. LVM with for a call back to arrange new pt appointment. Direct navigator phone number provided to pt [phone]

## 2023-06-20 ENCOUNTER — PATIENT MESSAGE (OUTPATIENT)
Dept: GYNECOLOGIC ONCOLOGY | Facility: CLINIC | Age: 48
End: 2023-06-20
Payer: MEDICAID

## 2023-06-20 NOTE — NURSING
Pt called to schedule new pt appointment from GYN/ONC referral and mailbox is full will send portal message.

## 2023-06-22 ENCOUNTER — HOSPITAL ENCOUNTER (OUTPATIENT)
Dept: RADIOLOGY | Facility: HOSPITAL | Age: 48
Discharge: HOME | End: 2023-06-22
Attending: SURGERY
Payer: MEDICAID

## 2023-06-22 DIAGNOSIS — Z12.31 ENCOUNTER FOR SCREENING MAMMOGRAM FOR BREAST CANCER: ICD-10-CM

## 2023-06-22 PROCEDURE — 77067 MAMMO DIGITAL SCREENING BILAT WITH TOMO: ICD-10-PCS | Mod: 26,,, | Performed by: RADIOLOGY

## 2023-06-22 PROCEDURE — 77067 SCR MAMMO BI INCL CAD: CPT | Mod: 26,,, | Performed by: RADIOLOGY

## 2023-06-22 PROCEDURE — 77063 BREAST TOMOSYNTHESIS BI: CPT | Mod: 26,,, | Performed by: RADIOLOGY

## 2023-06-22 PROCEDURE — 77063 MAMMO DIGITAL SCREENING BILAT WITH TOMO: ICD-10-PCS | Mod: 26,,, | Performed by: RADIOLOGY

## 2023-06-22 PROCEDURE — 77067 SCR MAMMO BI INCL CAD: CPT | Mod: TC

## 2023-06-28 NOTE — PROGRESS NOTES
Subjective:       Patient ID: Lesley Estrada is a 48 y.o. female.    Chief Complaint: Follow Up    Diagnosis: Cervical Cancer Stage 2B     Current Treatment: None    Treatment History:   Concurrent chemo/RT with Cisplatin 40 mg q.week x6 cycles, rescanned with PET/CT three-month once completion of 6 cycles/radiation    HPI  47 yr old who presents for evaluation of cervical cancer in March 2023. Upon initial diagnosis in January, she had the option of definitive chemo/XRT vs total hysterectomy and decided to proceed with surgery with Dr. Rhodes. Unfortunately at the time of surgery, an exam was done that revealed obvious tumor extension onto the anterior of the vagina with some possible involvement of the left lateral vaginal apex. The tumor itself was thought to be approximately 5cm at the time of bimanual exam on 3/20. Given these new progressive findings it was decided that patient would be a better candidate for curative intent if she underwent concurrent chemo/RT. She underwent concurrent chemo RT with weekly cisplatin 40mg in April - May '23. She tolerated therapy well. Follow up MRI of the pelvis with complete resolution of cervical mass and LAD. She is now under surveillance until repeat PET 3-4 months after radiation.     Interval History:  She is doing extremely well. Good energy. Feels her usual self.    She denies any neuropathy, shortness of breath, chest pain, pain, fatigue, recent hospitalizations or illnesses.    She denies any changes in her hearing, dizziness, ringing in ears.  CBC is back to normal after treatment      Past Medical History:   Diagnosis Date    ADD (attention deficit disorder)     Cancer 2023    Cervical Cancer    Essential (primary) hypertension     Generalized anxiety disorder       Past Surgical History:   Procedure Laterality Date    Arm surgery Left     HAD STAPH INFECTION  IN ARM AND WAS DRAINED    PLACEMENT, MEDIPORT N/A 4/10/2023    Procedure: Placement, Mediport;   Surgeon: Alexus Pollack MD;  Location: [facility];  Service: General;  Laterality: N/A;     Social History     Socioeconomic History    Marital status:    Tobacco Use    Smoking status: Never    Smokeless tobacco: Never   Substance and Sexual Activity    Alcohol use: Yes     Comment: 4 X WEEK    Drug use: Never    Sexual activity: Yes     Partners: Male      Family History   Problem Relation Age of Onset    Breast cancer Mother 56    Other Father         Had stints in his heart    Ovarian cancer Maternal Grandmother         50s    Breast cancer Maternal Grandmother         late 80s    Stomach cancer Maternal Grandfather         late 60s, early 70s    Kidney failure Paternal Grandmother     Diabetes Paternal Grandmother     Suicide Paternal Grandfather     Breast cancer Paternal Aunt 75      Review of patient's allergies indicates:  No Known Allergies       Review of Systems   Constitutional:  Negative for activity change, appetite change, chills, fatigue, fever and unexpected weight change.   HENT:  Negative for facial swelling, mouth sores, nosebleeds, sinus pressure/congestion and sore throat.    Eyes:  Negative for photophobia, pain and visual disturbance.   Respiratory:  Negative for cough, chest tightness, shortness of breath and wheezing.    Cardiovascular:  Negative for chest pain, palpitations and leg swelling.   Gastrointestinal:  Positive for change in bowel habit, diarrhea and change in bowel habit. Negative for abdominal pain, blood in stool, constipation, nausea and vomiting.   Endocrine: Negative.  Negative for polyuria.   Genitourinary:  Negative for dysuria, frequency, hematuria, hot flashes, pelvic pain, urgency and vaginal pain.   Musculoskeletal:  Negative for arthralgias, gait problem, joint swelling and myalgias.   Integumentary:  Negative for pallor, rash, wound, breast mass, breast discharge and breast tenderness.   Allergic/Immunologic: Negative.  Negative for immunocompromised state.    Neurological:  Negative for dizziness, vertigo, syncope, weakness, numbness and headaches.   Hematological:  Negative for adenopathy. Does not bruise/bleed easily.   Psychiatric/Behavioral:  Negative for behavioral problems, confusion and dysphoric mood. The patient is not nervous/anxious.    All other systems reviewed and are negative.  Breast: Negative for mass and tenderness      Objective:      Vitals:    06/29/23 0947   BP: 125/89   Pulse: 97   Resp: 18   Temp: 98.2 °F (36.8 °C)           Physical Exam  Constitutional:       General: She is not in acute distress.     Appearance: Normal appearance. She is not ill-appearing.   HENT:      Head: Normocephalic and atraumatic.      Nose: Nose normal.      Mouth/Throat:      Mouth: Mucous membranes are moist.      Pharynx: Oropharynx is clear.   Eyes:      Extraocular Movements: Extraocular movements intact.      Conjunctiva/sclera: Conjunctivae normal.      Pupils: Pupils are equal, round, and reactive to light.   Cardiovascular:      Rate and Rhythm: Normal rate and regular rhythm.      Pulses: Normal pulses.      Heart sounds: Normal heart sounds. No murmur heard.  Pulmonary:      Effort: Pulmonary effort is normal. No respiratory distress.      Breath sounds: Normal breath sounds.   Abdominal:      General: There is no distension.      Palpations: Abdomen is soft.      Tenderness: There is no abdominal tenderness.   Musculoskeletal:         General: Normal range of motion.      Cervical back: Normal range of motion and neck supple.      Right lower leg: No edema.      Left lower leg: No edema.   Lymphadenopathy:      Cervical: No cervical adenopathy.      Comments: No adenopathy noted bilaterally.   Skin:     General: Skin is warm and dry.   Neurological:      General: No focal deficit present.      Mental Status: She is alert and oriented to person, place, and time.       LABS AND IMAGING REVIEWED IN [facility]    Pathology:  6/21/22: Joshua MMG:IMPRESSION:  NEGATIVE    5/28/22 PAP Smear:  Positive- High Donell HPV  Positive- HPV 16    12/6/22 PAP Smear:  Positive- High Risk HPV  Positive- HPV 16    1/3/23 Cervical Biopsy:  Cervical Biopsy, 6 o'clock        - Squamous cell carcinoma, invasive, moderately differentiated  2.   Cervical Biopsy, 8 o'clock,         - Squamous cell carcinoma, invasive, moderately differentiated  3.   Cervical Biopsy, 10 o'clock        - Squamous cell carcinoma, invasive, moderately differentiated  4.   ECC        - Squamous cell carcinoma, invasive, moderately differentiated     Comment: Squamous cell carcinoma comprises almost the entirely of the tissue. Carcinoma involves the full thickness of these biopsies and invades at least 3 mm.    6/13/23 Biopsy of R breast:     IMAGING    2/3/2023 PET/CT:    Hypermetabolic activity noted within the cervix. No regional or distant metastatic disease.    2/10/23 MRI Pelvis:  4.3cm diameter primary cervical mass. No extension into the parametrial fat appreciated. Small but suspicious appearing left external iliac chain lymph nodes      Assessment:   Stage 2B SCC of Cervix    Family history of cancer - Seen by Genetics in April '23        Plan:   - Toxicity reviewed, completed chemotherapy and radiation well  - CBC has returned to normal since treatment, no role for further supplementation  - RTC 3 months for MD visit, labs same day to discuss previous rad onc scans    I spent a total of 35 minutes on the day of the visit.This includes face to face time and non-face to face time preparing to see the patient (eg, review of tests), obtaining and/or reviewing separately obtained history, documenting clinical information in the electronic or other health record, independently interpreting results and communicating results to the patient/family/caregiver, or care coordinator.      Elizabeth LeJeune, MD  Oncology/Hematology   [facility]

## 2023-06-29 ENCOUNTER — OFFICE VISIT (OUTPATIENT)
Dept: HEMATOLOGY/ONCOLOGY | Facility: CLINIC | Age: 48
End: 2023-06-29
Payer: MEDICAID

## 2023-06-29 VITALS
SYSTOLIC BLOOD PRESSURE: 125 MMHG | TEMPERATURE: 98 F | HEART RATE: 97 BPM | WEIGHT: 205 LBS | DIASTOLIC BLOOD PRESSURE: 89 MMHG | HEIGHT: 65 IN | OXYGEN SATURATION: 98 % | RESPIRATION RATE: 18 BRPM | BODY MASS INDEX: 34.16 KG/M2

## 2023-06-29 DIAGNOSIS — Z51.11 ENCOUNTER FOR CHEMOTHERAPY MANAGEMENT: Primary | ICD-10-CM

## 2023-06-29 DIAGNOSIS — C53.8 MALIGNANT NEOPLASM OF OVERLAPPING SITES OF CERVIX: ICD-10-CM

## 2023-06-29 PROCEDURE — 1160F RVW MEDS BY RX/DR IN RCRD: CPT | Mod: CPTII,,, | Performed by: STUDENT IN AN ORGANIZED HEALTH CARE EDUCATION/TRAINING PROGRAM

## 2023-06-29 PROCEDURE — 3079F DIAST BP 80-89 MM HG: CPT | Mod: CPTII,,, | Performed by: STUDENT IN AN ORGANIZED HEALTH CARE EDUCATION/TRAINING PROGRAM

## 2023-06-29 PROCEDURE — 1159F PR MEDICATION LIST DOCUMENTED IN MEDICAL RECORD: ICD-10-PCS | Mod: CPTII,,, | Performed by: STUDENT IN AN ORGANIZED HEALTH CARE EDUCATION/TRAINING PROGRAM

## 2023-06-29 PROCEDURE — 99999 PR PBB SHADOW E&M-EST. PATIENT-LVL V: CPT | Mod: PBBFAC,,, | Performed by: STUDENT IN AN ORGANIZED HEALTH CARE EDUCATION/TRAINING PROGRAM

## 2023-06-29 PROCEDURE — 3008F PR BODY MASS INDEX (BMI) DOCUMENTED: ICD-10-PCS | Mod: CPTII,,, | Performed by: STUDENT IN AN ORGANIZED HEALTH CARE EDUCATION/TRAINING PROGRAM

## 2023-06-29 PROCEDURE — 99214 PR OFFICE/OUTPT VISIT, EST, LEVL IV, 30-39 MIN: ICD-10-PCS | Mod: S$PBB,,, | Performed by: STUDENT IN AN ORGANIZED HEALTH CARE EDUCATION/TRAINING PROGRAM

## 2023-06-29 PROCEDURE — 3079F PR MOST RECENT DIASTOLIC BLOOD PRESSURE 80-89 MM HG: ICD-10-PCS | Mod: CPTII,,, | Performed by: STUDENT IN AN ORGANIZED HEALTH CARE EDUCATION/TRAINING PROGRAM

## 2023-06-29 PROCEDURE — 99999 PR PBB SHADOW E&M-EST. PATIENT-LVL V: ICD-10-PCS | Mod: PBBFAC,,, | Performed by: STUDENT IN AN ORGANIZED HEALTH CARE EDUCATION/TRAINING PROGRAM

## 2023-06-29 PROCEDURE — 3008F BODY MASS INDEX DOCD: CPT | Mod: CPTII,,, | Performed by: STUDENT IN AN ORGANIZED HEALTH CARE EDUCATION/TRAINING PROGRAM

## 2023-06-29 PROCEDURE — 3074F PR MOST RECENT SYSTOLIC BLOOD PRESSURE < 130 MM HG: ICD-10-PCS | Mod: CPTII,,, | Performed by: STUDENT IN AN ORGANIZED HEALTH CARE EDUCATION/TRAINING PROGRAM

## 2023-06-29 PROCEDURE — 99215 OFFICE O/P EST HI 40 MIN: CPT | Mod: PBBFAC | Performed by: STUDENT IN AN ORGANIZED HEALTH CARE EDUCATION/TRAINING PROGRAM

## 2023-06-29 PROCEDURE — 1159F MED LIST DOCD IN RCRD: CPT | Mod: CPTII,,, | Performed by: STUDENT IN AN ORGANIZED HEALTH CARE EDUCATION/TRAINING PROGRAM

## 2023-06-29 PROCEDURE — 1160F PR REVIEW ALL MEDS BY PRESCRIBER/CLIN PHARMACIST DOCUMENTED: ICD-10-PCS | Mod: CPTII,,, | Performed by: STUDENT IN AN ORGANIZED HEALTH CARE EDUCATION/TRAINING PROGRAM

## 2023-06-29 PROCEDURE — 99214 OFFICE O/P EST MOD 30 MIN: CPT | Mod: S$PBB,,, | Performed by: STUDENT IN AN ORGANIZED HEALTH CARE EDUCATION/TRAINING PROGRAM

## 2023-06-29 PROCEDURE — 3074F SYST BP LT 130 MM HG: CPT | Mod: CPTII,,, | Performed by: STUDENT IN AN ORGANIZED HEALTH CARE EDUCATION/TRAINING PROGRAM

## 2023-06-29 RX ORDER — BUPROPION HYDROCHLORIDE 150 MG/1
150 TABLET ORAL EVERY MORNING
COMMUNITY
Start: 2023-06-12

## 2023-07-07 NOTE — NURSING
New pt referral received from Dr Gandhi for pt to be seen with GYN/ONC. LVM with for a call back to arrange new pt appointment. Direct navigator phone number provided to pt [phone]

## 2023-07-13 NOTE — NURSING
Pt returned navigator call and apologized for not calling sooner.Pt updated that a new referral was received from Dr Gandhi to establish surveillance care for history of cervical cancer. Pt name and  verified. Explained my role as nurse navigator and direct number provided. Pt scheduled to see Dr Baum in the next available [facility] appointment. Patient encouraged to call for any questions or concerns about her care or appointments. Pt verbalized understanding. Date time and location of appointment reviewed with pt. Pt is currently living with her daughter and does not want an appointment mailed to her home address. Pt states that her daughter knows how to check her [facility] portal and will check the appointment there.     Oncology Navigation   Intake  Cancer Type: Gynecologic (squamous cell cervical carcinoma)  Internal / External Referral: Internal (Dr Paloma Gandhi)  Date of Referral: 23  Initial Nurse Navigator Contact: 23  Referral to Initial Contact Timeline (days): 4  Date Worked: 23     Treatment  Current Status: Surveillance       Medical Oncologist: Elizabeth Kennedy Lejeune, MD  Chemotherapy: Completed  Chemotherapy Regimen: Cisplatin weekly    Radiation Therapy: Completed (pelvic radiation concurrent wtih chemo 45Gy. Then also brachytherapy boost- 30Gy in 6 fractions)  Radiation Oncologist: Dr Paloma Gandhi  Start Date: 23  End Date: 23  Total cGy: 45  Total Treatments: 25    Procedures: Biopsy; PET scan; MRI  Biopsy Schedule Date: 23 (Cervical Biopsy:- Squamous cell carcinoma, invasive, moderately differentiated. Squamous cell carcinoma comprises almost the entirely of the tissue. Carcinoma involves the full thickness of these biopsies and invades at least 3 mm.)  MRI Schedule Date: 23 (The primary cervical mass is no longer identified with confidence. This is consistent with excellent treatment response.   The prior suspicious left external iliac chain  lymph node has decreased in size.)  PET Scan Schedule Date: 02/03/23 (Hypermetabolic activity noted within the cervix. No regional or distant metastatic disease.)       Radiation Oncologist: Dr Dow Prellop        Acuity      Follow Up  No follow-ups on file.

## 2023-08-08 ENCOUNTER — OFFICE VISIT (OUTPATIENT)
Dept: GYNECOLOGIC ONCOLOGY | Facility: CLINIC | Age: 48
End: 2023-08-08
Payer: MEDICAID

## 2023-08-08 VITALS
HEART RATE: 109 BPM | BODY MASS INDEX: 34.12 KG/M2 | DIASTOLIC BLOOD PRESSURE: 77 MMHG | HEIGHT: 65 IN | WEIGHT: 204.81 LBS | RESPIRATION RATE: 18 BRPM | SYSTOLIC BLOOD PRESSURE: 133 MMHG

## 2023-08-08 DIAGNOSIS — C53.8 MALIGNANT NEOPLASM OF OVERLAPPING SITES OF CERVIX: Primary | ICD-10-CM

## 2023-08-08 PROCEDURE — 1159F MED LIST DOCD IN RCRD: CPT | Mod: CPTII,S$GLB,, | Performed by: OBSTETRICS & GYNECOLOGY

## 2023-08-08 PROCEDURE — 3008F PR BODY MASS INDEX (BMI) DOCUMENTED: ICD-10-PCS | Mod: CPTII,S$GLB,, | Performed by: OBSTETRICS & GYNECOLOGY

## 2023-08-08 PROCEDURE — 1159F PR MEDICATION LIST DOCUMENTED IN MEDICAL RECORD: ICD-10-PCS | Mod: CPTII,S$GLB,, | Performed by: OBSTETRICS & GYNECOLOGY

## 2023-08-08 PROCEDURE — 3078F PR MOST RECENT DIASTOLIC BLOOD PRESSURE < 80 MM HG: ICD-10-PCS | Mod: CPTII,S$GLB,, | Performed by: OBSTETRICS & GYNECOLOGY

## 2023-08-08 PROCEDURE — 1160F PR REVIEW ALL MEDS BY PRESCRIBER/CLIN PHARMACIST DOCUMENTED: ICD-10-PCS | Mod: CPTII,S$GLB,, | Performed by: OBSTETRICS & GYNECOLOGY

## 2023-08-08 PROCEDURE — 3008F BODY MASS INDEX DOCD: CPT | Mod: CPTII,S$GLB,, | Performed by: OBSTETRICS & GYNECOLOGY

## 2023-08-08 PROCEDURE — 99205 PR OFFICE/OUTPT VISIT, NEW, LEVL V, 60-74 MIN: ICD-10-PCS | Mod: S$GLB,,, | Performed by: OBSTETRICS & GYNECOLOGY

## 2023-08-08 PROCEDURE — 3075F PR MOST RECENT SYSTOLIC BLOOD PRESS GE 130-139MM HG: ICD-10-PCS | Mod: CPTII,S$GLB,, | Performed by: OBSTETRICS & GYNECOLOGY

## 2023-08-08 PROCEDURE — 1160F RVW MEDS BY RX/DR IN RCRD: CPT | Mod: CPTII,S$GLB,, | Performed by: OBSTETRICS & GYNECOLOGY

## 2023-08-08 PROCEDURE — 3075F SYST BP GE 130 - 139MM HG: CPT | Mod: CPTII,S$GLB,, | Performed by: OBSTETRICS & GYNECOLOGY

## 2023-08-08 PROCEDURE — 99205 OFFICE O/P NEW HI 60 MIN: CPT | Mod: S$GLB,,, | Performed by: OBSTETRICS & GYNECOLOGY

## 2023-08-08 PROCEDURE — 3078F DIAST BP <80 MM HG: CPT | Mod: CPTII,S$GLB,, | Performed by: OBSTETRICS & GYNECOLOGY

## 2023-08-08 RX ORDER — AMLODIPINE BESYLATE 10 MG/1
10 TABLET ORAL DAILY
COMMUNITY

## 2023-11-01 DIAGNOSIS — C53.9 MALIGNANT NEOPLASM OF CERVIX, UNSPECIFIED SITE: Primary | ICD-10-CM

## 2023-11-08 NOTE — PROGRESS NOTES
Subjective:       Patient ID: Lesley Estrada is a 48 y.o. female.    Chief Complaint: Follow Up    Diagnosis: Cervical Cancer Stage 2B     Current Treatment: None    Treatment History:   Concurrent chemo/RT with Cisplatin 40 mg q.week x6 cycles- 4/6/23--->5/11/23    HPI  47 yr old who presents for evaluation of cervical cancer in March 2023. Upon initial diagnosis in January, she had the option of definitive chemo/XRT vs total hysterectomy and decided to proceed with surgery with Dr. Rhodes. Unfortunately at the time of surgery, an exam was done that revealed obvious tumor extension onto the anterior of the vagina with some possible involvement of the left lateral vaginal apex. The tumor itself was thought to be approximately 5cm at the time of bimanual exam on 3/20. Given these new progressive findings it was decided that patient would be a better candidate for curative intent if she underwent concurrent chemo/RT. She underwent concurrent chemo RT with weekly cisplatin 40mg in April - May '23. She tolerated therapy well. Follow up MRI of the pelvis with complete resolution of cervical mass and LAD. She is now under surveillance with repeat PET 3-4 months after radiation showing no evidence of disease.     Interval History:  Patient returns to clinic for a follow up with PET/CT scans. She had PET/CT completed 9/5/23. She completed radiation 5/11/23.   She is doing extremely well. Good energy. Feels her usual self. She denies any neuropathy, shortness of breath, chest pain, pain, fatigue, recent hospitalizations or illnesses. She denies any changes in her hearing, dizziness.        Past Medical History:   Diagnosis Date    ADD (attention deficit disorder)     Cancer 2023    Cervical Cancer    Essential (primary) hypertension     Generalized anxiety disorder       Past Surgical History:   Procedure Laterality Date    Arm surgery Left     HAD STAPH INFECTION  IN ARM AND WAS DRAINED    PLACEMENT, MEDIPORT N/A  4/10/2023    Procedure: Placement, Mediport;  Surgeon: Alexus Pollack MD;  Location: [facility] OR;  Service: General;  Laterality: N/A;     Social History     Socioeconomic History    Marital status:    Tobacco Use    Smoking status: Never    Smokeless tobacco: Never   Substance and Sexual Activity    Alcohol use: Yes     Comment: 4 X WEEK    Drug use: Never    Sexual activity: Yes     Partners: Male      Family History   Problem Relation Age of Onset    Breast cancer Mother 56    Other Father         Had stints in his heart    Ovarian cancer Maternal Grandmother         50s    Breast cancer Maternal Grandmother         late 80s    Stomach cancer Maternal Grandfather         late 60s, early 70s    Kidney failure Paternal Grandmother     Diabetes Paternal Grandmother     Suicide Paternal Grandfather     Breast cancer Paternal Aunt 75      Review of patient's allergies indicates:  No Known Allergies       Review of Systems   Constitutional:  Negative for activity change, appetite change, chills, fatigue, fever and unexpected weight change.   HENT:  Negative for facial swelling, mouth sores, nosebleeds, sinus pressure/congestion and sore throat.    Eyes:  Negative for photophobia, pain and visual disturbance.   Respiratory:  Negative for cough, chest tightness, shortness of breath and wheezing.    Cardiovascular:  Negative for chest pain, palpitations and leg swelling.   Gastrointestinal:  Positive for change in bowel habit and diarrhea. Negative for abdominal pain, blood in stool, constipation, nausea and vomiting.   Endocrine: Negative.  Negative for polyuria.   Genitourinary:  Negative for dysuria, frequency, hematuria, hot flashes, pelvic pain, urgency and vaginal pain.   Musculoskeletal:  Negative for arthralgias, gait problem, joint swelling and myalgias.   Integumentary:  Negative for pallor, rash, wound, breast mass, breast discharge and breast tenderness.   Allergic/Immunologic: Negative.  Negative for  immunocompromised state.   Neurological:  Negative for dizziness, vertigo, syncope, weakness, numbness and headaches.   Hematological:  Negative for adenopathy. Does not bruise/bleed easily.   Psychiatric/Behavioral:  Negative for behavioral problems, confusion and dysphoric mood. The patient is not nervous/anxious.    All other systems reviewed and are negative.  Breast: Negative for mass and tenderness        Objective:      Vitals:    11/09/23 0905   BP: 125/84   Pulse: 96   Resp: 18   Temp: 98.1 °F (36.7 °C)       Physical Exam  Constitutional:       General: She is not in acute distress.     Appearance: Normal appearance. She is not ill-appearing.   HENT:      Head: Normocephalic and atraumatic.      Nose: Nose normal.      Mouth/Throat:      Mouth: Mucous membranes are moist.      Pharynx: Oropharynx is clear.   Eyes:      Extraocular Movements: Extraocular movements intact.      Conjunctiva/sclera: Conjunctivae normal.      Pupils: Pupils are equal, round, and reactive to light.   Cardiovascular:      Rate and Rhythm: Normal rate and regular rhythm.      Pulses: Normal pulses.      Heart sounds: Normal heart sounds. No murmur heard.  Pulmonary:      Effort: Pulmonary effort is normal. No respiratory distress.      Breath sounds: Normal breath sounds.   Abdominal:      General: There is no distension.      Palpations: Abdomen is soft.      Tenderness: There is no abdominal tenderness.   Musculoskeletal:         General: Normal range of motion.      Cervical back: Normal range of motion and neck supple.      Right lower leg: No edema.      Left lower leg: No edema.   Lymphadenopathy:      Cervical: No cervical adenopathy.      Comments: No adenopathy noted bilaterally.   Skin:     General: Skin is warm and dry.   Neurological:      General: No focal deficit present.      Mental Status: She is alert and oriented to person, place, and time.         LABS AND IMAGING REVIEWED IN [facility]    Pathology:  6/21/22: Joshua  MMG:IMPRESSION: NEGATIVE    5/28/22 PAP Smear:  Positive- High Donell HPV  Positive- HPV 16    12/6/22 PAP Smear:  Positive- High Risk HPV  Positive- HPV 16    1/3/23 Cervical Biopsy:  Cervical Biopsy, 6 o'clock        - Squamous cell carcinoma, invasive, moderately differentiated  2.   Cervical Biopsy, 8 o'clock,         - Squamous cell carcinoma, invasive, moderately differentiated  3.   Cervical Biopsy, 10 o'clock        - Squamous cell carcinoma, invasive, moderately differentiated  4.   ECC        - Squamous cell carcinoma, invasive, moderately differentiated     Comment: Squamous cell carcinoma comprises almost the entirely of the tissue. Carcinoma involves the full thickness of these biopsies and invades at least 3 mm.    6/13/23 Biopsy of R breast:     IMAGING    2/3/2023 PET/CT:    Hypermetabolic activity noted within the cervix. No regional or distant metastatic disease.  2/10/23 MRI Pelvis:  4.3cm diameter primary cervical mass. No extension into the parametrial fat appreciated. Small but suspicious appearing left external iliac chain lymph nodes  9/5/23 PET/CT: Complete metabolic response of previously identified cervical mass. No evidence of metastatic disease. Previously identified focus of metabolic activity in the cervix is no longer identified. No abnormal uptake is seen.      Assessment:   Stage 2B SCC of Cervix    Family history of cancer - Seen by Genetics in April '23        Plan:   Keep f/u with Rad/On in December 2023  Keep f/u with Dr. Baum 12/5/23  RTC in 4 months with NP with labs  Cbc, cmp, ca 125- 1 hr prior @ [facility]    I spent a total of 30 minutes on the day of the visit.This includes face to face time and non-face to face time preparing to see the patient (eg, review of tests), obtaining and/or reviewing separately obtained history, documenting clinical information in the electronic or other health record, independently interpreting results and communicating results to the  patient/family/caregiver, or care coordinator.      Elizabeth LeJeune, MD  Oncology/Hematology   [facility] LPN, acted solely as a scribe for and in the presence of Dr. Elizabeth Lejeune, who performed these services.

## 2023-11-09 ENCOUNTER — OFFICE VISIT (OUTPATIENT)
Dept: HEMATOLOGY/ONCOLOGY | Facility: CLINIC | Age: 48
End: 2023-11-09
Payer: MEDICAID

## 2023-11-09 VITALS
WEIGHT: 204.19 LBS | HEIGHT: 65 IN | OXYGEN SATURATION: 96 % | TEMPERATURE: 98 F | RESPIRATION RATE: 18 BRPM | HEART RATE: 96 BPM | BODY MASS INDEX: 34.02 KG/M2 | SYSTOLIC BLOOD PRESSURE: 125 MMHG | DIASTOLIC BLOOD PRESSURE: 84 MMHG

## 2023-11-09 DIAGNOSIS — C53.8 MALIGNANT NEOPLASM OF OVERLAPPING SITES OF CERVIX: Primary | ICD-10-CM

## 2023-11-09 DIAGNOSIS — C53.9 MALIGNANT NEOPLASM OF CERVIX, UNSPECIFIED SITE: Primary | ICD-10-CM

## 2023-11-09 PROCEDURE — 3074F PR MOST RECENT SYSTOLIC BLOOD PRESSURE < 130 MM HG: ICD-10-PCS | Mod: CPTII,,, | Performed by: STUDENT IN AN ORGANIZED HEALTH CARE EDUCATION/TRAINING PROGRAM

## 2023-11-09 PROCEDURE — 1159F PR MEDICATION LIST DOCUMENTED IN MEDICAL RECORD: ICD-10-PCS | Mod: CPTII,,, | Performed by: STUDENT IN AN ORGANIZED HEALTH CARE EDUCATION/TRAINING PROGRAM

## 2023-11-09 PROCEDURE — 3074F SYST BP LT 130 MM HG: CPT | Mod: CPTII,,, | Performed by: STUDENT IN AN ORGANIZED HEALTH CARE EDUCATION/TRAINING PROGRAM

## 2023-11-09 PROCEDURE — 3008F PR BODY MASS INDEX (BMI) DOCUMENTED: ICD-10-PCS | Mod: CPTII,,, | Performed by: STUDENT IN AN ORGANIZED HEALTH CARE EDUCATION/TRAINING PROGRAM

## 2023-11-09 PROCEDURE — 99213 PR OFFICE/OUTPT VISIT, EST, LEVL III, 20-29 MIN: ICD-10-PCS | Mod: S$PBB,,, | Performed by: STUDENT IN AN ORGANIZED HEALTH CARE EDUCATION/TRAINING PROGRAM

## 2023-11-09 PROCEDURE — 3079F DIAST BP 80-89 MM HG: CPT | Mod: CPTII,,, | Performed by: STUDENT IN AN ORGANIZED HEALTH CARE EDUCATION/TRAINING PROGRAM

## 2023-11-09 PROCEDURE — 99999 PR PBB SHADOW E&M-EST. PATIENT-LVL V: CPT | Mod: PBBFAC,,, | Performed by: STUDENT IN AN ORGANIZED HEALTH CARE EDUCATION/TRAINING PROGRAM

## 2023-11-09 PROCEDURE — 3079F PR MOST RECENT DIASTOLIC BLOOD PRESSURE 80-89 MM HG: ICD-10-PCS | Mod: CPTII,,, | Performed by: STUDENT IN AN ORGANIZED HEALTH CARE EDUCATION/TRAINING PROGRAM

## 2023-11-09 PROCEDURE — 99999 PR PBB SHADOW E&M-EST. PATIENT-LVL V: ICD-10-PCS | Mod: PBBFAC,,, | Performed by: STUDENT IN AN ORGANIZED HEALTH CARE EDUCATION/TRAINING PROGRAM

## 2023-11-09 PROCEDURE — 1159F MED LIST DOCD IN RCRD: CPT | Mod: CPTII,,, | Performed by: STUDENT IN AN ORGANIZED HEALTH CARE EDUCATION/TRAINING PROGRAM

## 2023-11-09 PROCEDURE — 99213 OFFICE O/P EST LOW 20 MIN: CPT | Mod: S$PBB,,, | Performed by: STUDENT IN AN ORGANIZED HEALTH CARE EDUCATION/TRAINING PROGRAM

## 2023-11-09 PROCEDURE — 1160F PR REVIEW ALL MEDS BY PRESCRIBER/CLIN PHARMACIST DOCUMENTED: ICD-10-PCS | Mod: CPTII,,, | Performed by: STUDENT IN AN ORGANIZED HEALTH CARE EDUCATION/TRAINING PROGRAM

## 2023-11-09 PROCEDURE — 99215 OFFICE O/P EST HI 40 MIN: CPT | Mod: PBBFAC | Performed by: STUDENT IN AN ORGANIZED HEALTH CARE EDUCATION/TRAINING PROGRAM

## 2023-11-09 PROCEDURE — 1160F RVW MEDS BY RX/DR IN RCRD: CPT | Mod: CPTII,,, | Performed by: STUDENT IN AN ORGANIZED HEALTH CARE EDUCATION/TRAINING PROGRAM

## 2023-11-09 PROCEDURE — 3008F BODY MASS INDEX DOCD: CPT | Mod: CPTII,,, | Performed by: STUDENT IN AN ORGANIZED HEALTH CARE EDUCATION/TRAINING PROGRAM

## 2023-11-09 RX ORDER — SERDEXMETHYLPHENIDATE AND DEXMETHYLPHENIDATE 7.8; 39.2 MG/1; MG/1
1 CAPSULE ORAL
COMMUNITY
Start: 2023-10-16

## 2023-11-09 RX ORDER — PROPRANOLOL HYDROCHLORIDE 10 MG/1
TABLET ORAL
COMMUNITY
Start: 2023-09-20

## 2023-11-30 NOTE — PROGRESS NOTES
REFERRING PROVIDER  Paloma Gandhi MD   Heme/onc: Dr. Elizabeth Lejeune     INTERVAL HISTORY  CC: stage (at least) IIA2 Squamous Cell Cervical Cancer surveillance   Lesley Estrada is a 48 y.o. presents to clinic for cervical cancer surveillance. She is s/p chemoradiation completed 2023. Post treatment PET CT shows complete response.  She had a benign pelvic exam with Dr. Gandhi 2 weeks ago.    HPI or ONCOLOGIC HISTORY  Lesley Estrada is a 48 y.o.  referred for surveillance of cervical cancer post primary treatment with chemo/RT. She was under surveillance for high risk cervical pathology found in May 2022. In 2023, colposcopy and biopsy revealed SCC. On PET, pelvic MR and pelvic exam, the disease appeared to be clinically stage I (localized to the cervix). On 2023, EUA by Dr. Rhodes revealed tumor extension into the anterior vagina and possibly left lateral vaginal apex.  She was subsequently treated with chemoradiation (Dr. Gandhi and Dr. Lejeune) which was completed in May 2023 with significant treatment response per imaging.      Data Reviewed:   22 PAP Smear: ASC-US, HPV + (16, high risk)     22 PAP Smear: HSIL, HPV + (16, high risk)     1/3/23 Cervical Biopsy, 6 o'clock, 8 o'clock, 10 o'clock and ECC: Squamous cell carcinoma, invasive, moderately differentiated. Comment: Squamous cell carcinoma comprises almost the entirely of the tissue. Carcinoma involves the full thickness of these biopsies and invades at least 3 mm.     23 PET: Increased hypermetabolic activity is present within the cervical os measuring up to 6.4 SUV. No hypermetabolic pelvic or para-aortic lymphadenopathy. No subserosal extension. No hydronephrosis.      2/10/23 MRI Pelvis: uterus 9.2 x 5 x 6.5. An intermediate T2 signal hypoenhancing cervical mass is present measuring approximately 4.3 x 3.1 x 2.7 cm.  No spread into the parametrial fat is appreciated. There are two irregularly-shaped lymph nodes  in the left external iliac chain measuring approximately 6 mm in short axis with T2 signal intensity similar to the primary cervical tumor. These are not hypermetabolic on the recent PET potentially due to small size, but by MRI are highly suspicious for delmar metastasis. No additional suspicious lymph nodes are seen.  Both ovaries are visualized and are unremarkable.       03/21/23 EUA  FINDINGS:The vagina was free of any distal involvement. However, the area of the cervix had a 5 cm firm fixed tumor with a growth into the anterior vaginal wall. There was also distinctly palpable tumor, extending into the proximal left parametrium and into the left uterosacral area. The tumor did not extend it to the pelvic sidewall. The right parametria did not have obvious tumor. The remainder of the surrounding areas were free of any concern for involvement, including the bladder and rectum.      04/05/23-05/31/23 chemoradiation regimen: 40mg Cisplatin weekly 6 cycles (Medical Oncologist: Elizabeth Kennedy Lejeune, MD). Pelvic radiation concurrent wtih chemo 45Gy, total treatments: 25 with brachytherapy boost- 30Gy in 6 fractions (Radiation Oncologist: Dr Dow Prellop)     05/05/23 MRI (post treatment):  There is now heterogeneous T2 signal in the cervix. On postcontrast imaging, the prior clearly defined hypoenhancing cervical mass is no longer confidently seen. The suspicious left external iliac chain lymph node has decreased in size, now measuring 5 mm in short axis. No new or enlarging retroperitoneal lymph nodes are seen. The primary cervical mass is no longer identified with confidence. This is consistent with excellent treatment response. 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    9/5/23 PET/CT: Complete metabolic response of previously identified cervical mass. No  evidence of metastatic disease. Previously identified focus of metabolic activity in the cervix is no longer identified. No abnormal uptake is seen.     REVIEW OF SYSTEMS  Review of Systems   Constitutional:  Negative for appetite change and unexpected weight change.   Respiratory:  Negative for cough and shortness of breath.    Gastrointestinal:  Negative for abdominal distention, abdominal pain, constipation, diarrhea, nausea and vomiting.   Genitourinary:  Negative for difficulty urinating, dysuria, frequency and vaginal bleeding.    Neurological:  Negative for dizziness and light-headedness.     OBJECTIVE   Vitals:    12/05/23 1451   BP: 118/79   Pulse: 85      Body mass index is 32.68 kg/m².     Physical Exam:   Constitutional: She is oriented to person, place, and time. She appears well-developed.        Pulmonary/Chest: Effort normal and breath sounds normal. No respiratory distress.        Abdominal: Soft. She exhibits no distension. There is no abdominal tenderness.     Genitourinary:    Genitourinary Comments:                    Neurological: She is alert and oriented to person, place, and time.    Skin: No rash noted.    Psychiatric: She has a normal mood and affect. Judgment normal.     ECOG status: 0    LABORATORY DATA  Lab data reviewed.    RADIOLOGICAL DATA  Radiology data reviewed.    PATHOLOGY DATA  Pathology data reviewed.    ASSESSMENT    1. Malignant neoplasm of overlapping sites of cervix       The patient was seen, examined, and counseled by me.  She remains DANDRE.  She will continue with routine surveillance and will contact us promptly if she has any problems or questions in the interim.     Per SGO 2017 guidelines, for high risk (advanced stage, treated with primary chemotherapy/radiation therapy or surgery plus adjuvant therapy) follow-up is every 3 months for 2 years, then every 6 months years 3 - 5 after which they are seen annually by a generalist or gynecologic oncologist.  Cytology can  be considered.  There is insufficient evidence for detection of cancer recurrence but may have value in the detection of lower genital tract neoplasia.  There is insufficient data to support routine use of radiographic imaging.  PLAN  Follow-up with oncology team every 3 - 4 months.  Happy to alternate / coordinate with Dr. Gadnhi / Lejeune Chad Hamilton, MD

## 2023-12-05 ENCOUNTER — OFFICE VISIT (OUTPATIENT)
Dept: GYNECOLOGIC ONCOLOGY | Facility: CLINIC | Age: 48
End: 2023-12-05
Payer: MEDICAID

## 2023-12-05 VITALS
SYSTOLIC BLOOD PRESSURE: 118 MMHG | HEIGHT: 65 IN | DIASTOLIC BLOOD PRESSURE: 79 MMHG | BODY MASS INDEX: 32.72 KG/M2 | WEIGHT: 196.38 LBS | HEART RATE: 85 BPM

## 2023-12-05 DIAGNOSIS — C53.8 MALIGNANT NEOPLASM OF OVERLAPPING SITES OF CERVIX: Primary | ICD-10-CM

## 2023-12-05 PROCEDURE — 3078F DIAST BP <80 MM HG: CPT | Mod: CPTII,S$GLB,, | Performed by: OBSTETRICS & GYNECOLOGY

## 2023-12-05 PROCEDURE — 99212 OFFICE O/P EST SF 10 MIN: CPT | Mod: S$GLB,,, | Performed by: OBSTETRICS & GYNECOLOGY

## 2023-12-05 PROCEDURE — 3008F PR BODY MASS INDEX (BMI) DOCUMENTED: ICD-10-PCS | Mod: CPTII,S$GLB,, | Performed by: OBSTETRICS & GYNECOLOGY

## 2023-12-05 PROCEDURE — 3074F PR MOST RECENT SYSTOLIC BLOOD PRESSURE < 130 MM HG: ICD-10-PCS | Mod: CPTII,S$GLB,, | Performed by: OBSTETRICS & GYNECOLOGY

## 2023-12-05 PROCEDURE — 3008F BODY MASS INDEX DOCD: CPT | Mod: CPTII,S$GLB,, | Performed by: OBSTETRICS & GYNECOLOGY

## 2023-12-05 PROCEDURE — 99212 PR OFFICE/OUTPT VISIT, EST, LEVL II, 10-19 MIN: ICD-10-PCS | Mod: S$GLB,,, | Performed by: OBSTETRICS & GYNECOLOGY

## 2023-12-05 PROCEDURE — 3074F SYST BP LT 130 MM HG: CPT | Mod: CPTII,S$GLB,, | Performed by: OBSTETRICS & GYNECOLOGY

## 2023-12-05 PROCEDURE — 3078F PR MOST RECENT DIASTOLIC BLOOD PRESSURE < 80 MM HG: ICD-10-PCS | Mod: CPTII,S$GLB,, | Performed by: OBSTETRICS & GYNECOLOGY

## 2024-03-06 ENCOUNTER — OFFICE VISIT (OUTPATIENT)
Dept: GYNECOLOGIC ONCOLOGY | Facility: CLINIC | Age: 49
End: 2024-03-06
Payer: MEDICAID

## 2024-03-06 VITALS
WEIGHT: 207.69 LBS | SYSTOLIC BLOOD PRESSURE: 122 MMHG | HEART RATE: 87 BPM | HEIGHT: 65 IN | DIASTOLIC BLOOD PRESSURE: 85 MMHG | BODY MASS INDEX: 34.6 KG/M2

## 2024-03-06 DIAGNOSIS — N39.492 POSTURAL URINARY INCONTINENCE: ICD-10-CM

## 2024-03-06 DIAGNOSIS — C53.8 MALIGNANT NEOPLASM OF OVERLAPPING SITES OF CERVIX: Primary | ICD-10-CM

## 2024-03-06 PROCEDURE — 1159F MED LIST DOCD IN RCRD: CPT | Mod: CPTII,S$GLB,,

## 2024-03-06 PROCEDURE — 3074F SYST BP LT 130 MM HG: CPT | Mod: CPTII,S$GLB,,

## 2024-03-06 PROCEDURE — 3079F DIAST BP 80-89 MM HG: CPT | Mod: CPTII,S$GLB,,

## 2024-03-06 PROCEDURE — 1160F RVW MEDS BY RX/DR IN RCRD: CPT | Mod: CPTII,S$GLB,,

## 2024-03-06 PROCEDURE — 3008F BODY MASS INDEX DOCD: CPT | Mod: CPTII,S$GLB,,

## 2024-03-06 PROCEDURE — 99214 OFFICE O/P EST MOD 30 MIN: CPT | Mod: S$GLB,,,

## 2024-03-06 NOTE — Clinical Note
This is a cervix cancer s/p pelvic RT that c/o of ' urine leakage'  worsening over the past 3 months. I ruled out fistula (refer to my note) and it seems to be urinary incontinence (stress/overflow/postural). I referred her to urology for eval. Any other suggestions? Maybe  medication or I read hyperbaric oxygen may help. Thank you!

## 2024-03-06 NOTE — PROGRESS NOTES
REFERRING PROVIDER  Paloma Gandhi MD   Heme/onc: Dr. Elizabeth Lejeune     INTERVAL HISTORY  CC: stage (at least) IIA2 Squamous Cell Cervical Cancer surveillance   Lesley Estrada is a 48 y.o. presents to clinic for cervical cancer surveillance. She is s/p chemoradiation completed 2023. Post treatment PET CT shows complete response.  She reports for the past three months she has noticed 'urine leakage' when she goes from a seated to standing position. She denies history of urinary incontinence.      HPI or ONCOLOGIC HISTORY  Lesley Estrada is a 48 y.o.  referred for surveillance of cervical cancer post primary treatment with chemo/RT. She was under surveillance for high risk cervical pathology found in May 2022. In 2023, colposcopy and biopsy revealed SCC. On PET, pelvic MR and pelvic exam, the disease appeared to be clinically stage I (localized to the cervix). On 2023, EUA by Dr. Rhodes revealed tumor extension into the anterior vagina and possibly left lateral vaginal apex.  She was subsequently treated with chemoradiation (Dr. Gandhi and Dr. Lejeune) which was completed in May 2023 with significant treatment response per imaging.      Data Reviewed:   22 PAP Smear: ASC-US, HPV + (16, high risk)     22 PAP Smear: HSIL, HPV + (16, high risk)     1/3/23 Cervical Biopsy, 6 o'clock, 8 o'clock, 10 o'clock and ECC: Squamous cell carcinoma, invasive, moderately differentiated. Comment: Squamous cell carcinoma comprises almost the entirely of the tissue. Carcinoma involves the full thickness of these biopsies and invades at least 3 mm.     23 PET: Increased hypermetabolic activity is present within the cervical os measuring up to 6.4 SUV. No hypermetabolic pelvic or para-aortic lymphadenopathy. No subserosal extension. No hydronephrosis.      2/10/23 MRI Pelvis: uterus 9.2 x 5 x 6.5. An intermediate T2 signal hypoenhancing cervical mass is present measuring approximately 4.3 x 3.1  x 2.7 cm.  No spread into the parametrial fat is appreciated. There are two irregularly-shaped lymph nodes in the left external iliac chain measuring approximately 6 mm in short axis with T2 signal intensity similar to the primary cervical tumor. These are not hypermetabolic on the recent PET potentially due to small size, but by MRI are highly suspicious for delmar metastasis. No additional suspicious lymph nodes are seen.  Both ovaries are visualized and are unremarkable.       03/21/23 EUA  FINDINGS:The vagina was free of any distal involvement. However, the area of the cervix had a 5 cm firm fixed tumor with a growth into the anterior vaginal wall. There was also distinctly palpable tumor, extending into the proximal left parametrium and into the left uterosacral area. The tumor did not extend it to the pelvic sidewall. The right parametria did not have obvious tumor. The remainder of the surrounding areas were free of any concern for involvement, including the bladder and rectum.      04/05/23-05/31/23 chemoradiation regimen: 40mg Cisplatin weekly 6 cycles (Medical Oncologist: Elizabeth Kennedy Lejeune, MD). Pelvic radiation concurrent wtih chemo 45Gy, total treatments: 25 with brachytherapy boost- 30Gy in 6 fractions (Radiation Oncologist: Dr Dow Prellop)     05/05/23 MRI (post treatment):  There is now heterogeneous T2 signal in the cervix. On postcontrast imaging, the prior clearly defined hypoenhancing cervical mass is no longer confidently seen. The suspicious left external iliac chain lymph node has decreased in size, now measuring 5 mm in short axis. No new or enlarging retroperitoneal lymph nodes are seen. The primary cervical mass is no longer identified with confidence. This is consistent with excellent treatment response. The prior suspicious left external iliac chain lymph node has decreased in size.)     05/22/23 Genetic testing ([employer] RNA panal):  heterozygous deleterious mutation: NTHL1  p.Q90*  c.268C>T.     9/5/23 PET/CT: Complete metabolic response of previously identified cervical mass. No evidence of metastatic disease. Previously identified focus of metabolic activity in the cervix is no longer identified. No abnormal uptake is seen.   REVIEW OF SYSTEMS  Review of Systems   Constitutional:  Negative for appetite change and unexpected weight change.   Respiratory:  Negative for cough and shortness of breath.    Gastrointestinal:  Negative for abdominal distention, abdominal pain, constipation, diarrhea, nausea and vomiting.   Genitourinary:  Positive for bladder incontinence. Negative for difficulty urinating, dysuria, frequency and vaginal bleeding.    Neurological:  Negative for dizziness and light-headedness.         OBJECTIVE   Vitals:    03/06/24 1514   BP: 122/85   Pulse: 87      Body mass index is 34.56 kg/m².     Physical Exam:   Constitutional: She is oriented to person, place, and time. She appears well-developed.        Pulmonary/Chest: Effort normal and breath sounds normal. No respiratory distress.        Abdominal: Soft. She exhibits no distension. There is no abdominal tenderness.     Genitourinary:    Genitourinary Comments: Vulva - normal  Vagina - no lesions  Cervix - no lesions or mass  Uterus - non-tender   Adnexa - no masses or fullness, non-tender     No fluid in vaginal vault. Instructed patient to perform valsalva in which large volume of urine leaked from the urethra.                    Neurological: She is alert and oriented to person, place, and time.    Skin: No rash noted.    Psychiatric: She has a normal mood and affect. Judgment normal.     ECOG status: 0    LABORATORY DATA  Lab data reviewed.    RADIOLOGICAL DATA  Radiology data reviewed.    PATHOLOGY DATA  Pathology data reviewed.    ASSESSMENT    1. Malignant neoplasm of overlapping sites of cervix    2. Postural urinary incontinence        The patient was seen, examined, and counseled by me.  She remains DANDRE.   She will continue with routine surveillance and will contact us promptly if she has any problems or questions in the interim.     Per SGO 2017 guidelines, for high risk (advanced stage, treated with primary chemotherapy/radiation therapy or surgery plus adjuvant therapy) follow-up is every 3 months for 2 years, then every 6 months years 3 - 5 after which they are seen annually by a generalist or gynecologic oncologist.  Cytology can be considered.  There is insufficient evidence for detection of cancer recurrence but may have value in the detection of lower genital tract neoplasia.  There is insufficient data to support routine use of radiographic imaging.  PLAN  No orders of the defined types were placed in this encounter.  Follow-up with oncology team every 3 - 4 months. Happy to alternate / coordinate with Dr. Gandhi.  2.   Referral sent to [facility] for evaluation of urinary incontinence. High suspicion for late  toxicity from pelvic radiation (frequency, urgency, decreased bladder capacity). Will also discuss with Dr. Baum and Dr. Gandhi regarding possible treatment options.     Brigida Moreno PA-C

## 2024-03-11 ENCOUNTER — INFUSION (OUTPATIENT)
Dept: INFUSION THERAPY | Facility: HOSPITAL | Age: 49
End: 2024-03-11
Payer: MEDICAID

## 2024-03-11 ENCOUNTER — OFFICE VISIT (OUTPATIENT)
Dept: HEMATOLOGY/ONCOLOGY | Facility: CLINIC | Age: 49
End: 2024-03-11
Payer: MEDICAID

## 2024-03-11 VITALS
RESPIRATION RATE: 20 BRPM | HEIGHT: 65 IN | SYSTOLIC BLOOD PRESSURE: 113 MMHG | TEMPERATURE: 98 F | HEART RATE: 71 BPM | WEIGHT: 209.19 LBS | BODY MASS INDEX: 34.85 KG/M2 | OXYGEN SATURATION: 100 % | DIASTOLIC BLOOD PRESSURE: 79 MMHG

## 2024-03-11 VITALS
RESPIRATION RATE: 20 BRPM | HEIGHT: 65 IN | SYSTOLIC BLOOD PRESSURE: 113 MMHG | TEMPERATURE: 98 F | BODY MASS INDEX: 34.85 KG/M2 | DIASTOLIC BLOOD PRESSURE: 79 MMHG | HEART RATE: 71 BPM | WEIGHT: 209.19 LBS | OXYGEN SATURATION: 100 %

## 2024-03-11 DIAGNOSIS — C53.8 MALIGNANT NEOPLASM OF OVERLAPPING SITES OF CERVIX: Primary | ICD-10-CM

## 2024-03-11 DIAGNOSIS — C53.9 MALIGNANT NEOPLASM OF CERVIX, UNSPECIFIED SITE: Primary | ICD-10-CM

## 2024-03-11 PROCEDURE — 99999 PR PBB SHADOW E&M-EST. PATIENT-LVL V: CPT | Mod: PBBFAC,,,

## 2024-03-11 PROCEDURE — 96523 IRRIG DRUG DELIVERY DEVICE: CPT

## 2024-03-11 PROCEDURE — 63600175 PHARM REV CODE 636 W HCPCS

## 2024-03-11 PROCEDURE — 99215 OFFICE O/P EST HI 40 MIN: CPT | Mod: PBBFAC,25

## 2024-03-11 PROCEDURE — 3008F BODY MASS INDEX DOCD: CPT | Mod: CPTII,,,

## 2024-03-11 PROCEDURE — 3074F SYST BP LT 130 MM HG: CPT | Mod: CPTII,,,

## 2024-03-11 PROCEDURE — 1160F RVW MEDS BY RX/DR IN RCRD: CPT | Mod: CPTII,,,

## 2024-03-11 PROCEDURE — 25000003 PHARM REV CODE 250

## 2024-03-11 PROCEDURE — 3078F DIAST BP <80 MM HG: CPT | Mod: CPTII,,,

## 2024-03-11 PROCEDURE — A4216 STERILE WATER/SALINE, 10 ML: HCPCS

## 2024-03-11 PROCEDURE — 1159F MED LIST DOCD IN RCRD: CPT | Mod: CPTII,,,

## 2024-03-11 PROCEDURE — 99214 OFFICE O/P EST MOD 30 MIN: CPT | Mod: S$PBB,,,

## 2024-03-11 RX ORDER — SODIUM CHLORIDE 0.9 % (FLUSH) 0.9 %
10 SYRINGE (ML) INJECTION
OUTPATIENT
Start: 2024-03-11

## 2024-03-11 RX ORDER — SODIUM CHLORIDE 0.9 % (FLUSH) 0.9 %
10 SYRINGE (ML) INJECTION
Status: DISCONTINUED | OUTPATIENT
Start: 2024-03-11 | End: 2024-03-11 | Stop reason: HOSPADM

## 2024-03-11 RX ORDER — HEPARIN 100 UNIT/ML
500 SYRINGE INTRAVENOUS
Status: DISCONTINUED | OUTPATIENT
Start: 2024-03-11 | End: 2024-03-11 | Stop reason: HOSPADM

## 2024-03-11 RX ORDER — HEPARIN 100 UNIT/ML
500 SYRINGE INTRAVENOUS
Status: CANCELLED | OUTPATIENT
Start: 2024-03-11

## 2024-03-11 RX ORDER — METHYLPHENIDATE HYDROCHLORIDE 10 MG/1
10 TABLET ORAL 2 TIMES DAILY
COMMUNITY
Start: 2024-03-04

## 2024-03-11 RX ORDER — HEPARIN 100 UNIT/ML
500 SYRINGE INTRAVENOUS
OUTPATIENT
Start: 2024-03-11

## 2024-03-11 RX ORDER — SODIUM CHLORIDE 0.9 % (FLUSH) 0.9 %
10 SYRINGE (ML) INJECTION
Status: CANCELLED | OUTPATIENT
Start: 2024-03-11

## 2024-03-11 RX ADMIN — Medication 500 UNITS: at 02:03

## 2024-03-11 RX ADMIN — Medication 10 ML: at 02:03

## 2024-03-11 NOTE — PROGRESS NOTES
Subjective:       Patient ID: Lesley Estrada is a 48 y.o. female.    Chief Complaint: Follow Up    Diagnosis: Cervical Cancer Stage 2B     Current Treatment: None    Treatment History:   Concurrent chemo/RT with Cisplatin 40 mg q.week x6 cycles- 4/6/23--->5/11/23    HPI  47 yr old who presents for evaluation of cervical cancer in March 2023. Upon initial diagnosis in January, she had the option of definitive chemo/XRT vs total hysterectomy and decided to proceed with surgery with Dr. Rhodes. Unfortunately at the time of surgery, an exam was done that revealed obvious tumor extension onto the anterior of the vagina with some possible involvement of the left lateral vaginal apex. The tumor itself was thought to be approximately 5cm at the time of bimanual exam on 3/20. Given these new progressive findings it was decided that patient would be a better candidate for curative intent if she underwent concurrent chemo/RT. She underwent concurrent chemo RT with weekly cisplatin 40mg in April - May '23. She tolerated therapy well. Follow up MRI of the pelvis with complete resolution of cervical mass and LAD. She is now under surveillance with repeat PET 3-4 months after radiation showing no evidence of disease.     Interval History:  Patient returns to clinic for a four month follow up, accompanied by her . She completed radiation and chemotherapy on 5/11/23. She is doing great today and denies any complaints.  She denies any neuropathy, shortness of breath, chest pain, pain, fatigue, recent hospitalizations or illnesses. She denies any changes in her hearing, dizziness.Labs reviewed with patient and .        Past Medical History:   Diagnosis Date    ADD (attention deficit disorder)     Cancer 2023    Cervical Cancer    Essential (primary) hypertension     Generalized anxiety disorder       Past Surgical History:   Procedure Laterality Date    Arm surgery Left     HAD STAPH INFECTION  IN ARM AND WAS DRAINED     PLACEMENT, MEDIPORT N/A 4/10/2023    Procedure: Placement, Mediport;  Surgeon: Alexus Pollack MD;  Location: [facility] OR;  Service: General;  Laterality: N/A;     Social History     Socioeconomic History    Marital status:    Tobacco Use    Smoking status: Never    Smokeless tobacco: Never   Substance and Sexual Activity    Alcohol use: Yes     Comment: 4 X WEEK    Drug use: Never    Sexual activity: Yes     Partners: Male      Family History   Problem Relation Age of Onset    Breast cancer Mother 56    Other Father         Had stints in his heart    Ovarian cancer Maternal Grandmother         50s    Breast cancer Maternal Grandmother         late 80s    Stomach cancer Maternal Grandfather         late 60s, early 70s    Kidney failure Paternal Grandmother     Diabetes Paternal Grandmother     Suicide Paternal Grandfather     Breast cancer Paternal Aunt 75      Review of patient's allergies indicates:  No Known Allergies       Review of Systems   Constitutional:  Negative for activity change, appetite change, chills, fatigue, fever and unexpected weight change.   HENT:  Negative for facial swelling, mouth sores, nosebleeds, sinus pressure/congestion and sore throat.    Eyes:  Negative for photophobia, pain and visual disturbance.   Respiratory:  Negative for cough, chest tightness, shortness of breath and wheezing.    Cardiovascular:  Negative for chest pain, palpitations and leg swelling.   Gastrointestinal:  Negative for abdominal pain, blood in stool, change in bowel habit, constipation, diarrhea, nausea and vomiting.   Endocrine: Negative.  Negative for polyuria.   Genitourinary:  Negative for dysuria, frequency, hematuria, hot flashes, pelvic pain, urgency and vaginal pain.   Musculoskeletal:  Negative for arthralgias, gait problem, joint swelling and myalgias.   Integumentary:  Negative for pallor, rash, wound, breast mass, breast discharge and breast tenderness.   Allergic/Immunologic: Negative.   Negative for immunocompromised state.   Neurological:  Negative for dizziness, vertigo, syncope, weakness, numbness and headaches.   Hematological:  Negative for adenopathy. Does not bruise/bleed easily.   Psychiatric/Behavioral:  Negative for behavioral problems, confusion and dysphoric mood. The patient is not nervous/anxious.    All other systems reviewed and are negative.  Breast: Negative for mass and tenderness        Objective:      Vitals:    03/11/24 1314   BP: 113/79   Pulse: 71   Resp: 20   Temp: 97.9 °F (36.6 °C)       Physical Exam  Constitutional:       General: She is not in acute distress.     Appearance: Normal appearance. She is not ill-appearing.   HENT:      Head: Normocephalic and atraumatic.      Nose: Nose normal.      Mouth/Throat:      Mouth: Mucous membranes are moist.      Pharynx: Oropharynx is clear.   Eyes:      Extraocular Movements: Extraocular movements intact.      Conjunctiva/sclera: Conjunctivae normal.      Pupils: Pupils are equal, round, and reactive to light.   Cardiovascular:      Rate and Rhythm: Normal rate and regular rhythm.      Pulses: Normal pulses.      Heart sounds: Normal heart sounds. No murmur heard.  Pulmonary:      Effort: Pulmonary effort is normal. No respiratory distress.      Breath sounds: Normal breath sounds.   Abdominal:      General: There is no distension.      Palpations: Abdomen is soft.      Tenderness: There is no abdominal tenderness.   Musculoskeletal:         General: Normal range of motion.      Cervical back: Normal range of motion and neck supple.      Right lower leg: No edema.      Left lower leg: No edema.   Lymphadenopathy:      Cervical: No cervical adenopathy.      Upper Body:      Right upper body: No supraclavicular or axillary adenopathy.      Left upper body: No supraclavicular or axillary adenopathy.      Comments: No adenopathy noted bilaterally.   Skin:     General: Skin is warm and dry.   Neurological:      General: No focal  deficit present.      Mental Status: She is alert and oriented to person, place, and time.         LABS AND IMAGING REVIEWED IN [facility]    Pathology:  6/21/22: Joshua MMG:IMPRESSION: NEGATIVE    5/28/22 PAP Smear:  Positive- High [place] HPV  Positive- HPV 16    12/6/22 PAP Smear:  Positive- High Risk HPV  Positive- HPV 16    1/3/23 Cervical Biopsy:  Cervical Biopsy, 6 o'clock        - Squamous cell carcinoma, invasive, moderately differentiated  2.   Cervical Biopsy, 8 o'clock,         - Squamous cell carcinoma, invasive, moderately differentiated  3.   Cervical Biopsy, 10 o'clock        - Squamous cell carcinoma, invasive, moderately differentiated  4.   ECC        - Squamous cell carcinoma, invasive, moderately differentiated     Comment: Squamous cell carcinoma comprises almost the entirely of the tissue. Carcinoma involves the full thickness of these biopsies and invades at least 3 mm.    6/13/23 Biopsy of R breast:     IMAGING    2/3/2023 PET/CT:    Hypermetabolic activity noted within the cervix. No regional or distant metastatic disease.  2/10/23 MRI Pelvis:  4.3cm diameter primary cervical mass. No extension into the parametrial fat appreciated. Small but suspicious appearing left external iliac chain lymph nodes  9/5/23 PET/CT: Complete metabolic response of previously identified cervical mass. No evidence of metastatic disease. Previously identified focus of metabolic activity in the cervix is no longer identified. No abnormal uptake is seen.      Assessment:   Stage 2B SCC of Cervix    Family history of cancer - Seen by Genetics in April '23        Plan:   Mediport flush today and [id number]  Keep f/u with Rad/On   Keep f/u with Dr. Baum   RTC in 4 months with NP with labs  Cbc, cmp, ca 125- 1 hr prior @ [facility]    I spent a total of 30 minutes on the day of the visit.This includes face to face time and non-face to face time preparing to see the patient (eg, review of tests), obtaining and/or reviewing separately  obtained history, documenting clinical information in the electronic or other health record, independently interpreting results and communicating results to the patient/family/caregiver, or care coordinator.    SAIRA Melgoza-C  Oncology/Hematology  [facility]

## 2024-03-11 NOTE — PLAN OF CARE
Patient will have no s/s of infection to mediport site. Will perform aseptic technique when accessing and flushing

## 2024-06-05 ENCOUNTER — TELEPHONE (OUTPATIENT)
Dept: GYNECOLOGIC ONCOLOGY | Facility: CLINIC | Age: 49
End: 2024-06-05
Payer: MEDICAID

## 2024-06-05 NOTE — TELEPHONE ENCOUNTER
Left vm for pt to contact clinic regarding reschedule request with Dr. Baum.     Will send [facility] message.        ----- Message from Jessica Cifuentes RN sent at 6/3/2024 11:06 AM CDT -----    ----- Message -----  From: Rodrick Sawyer  Sent: 6/3/2024  10:57 AM CDT  To: Bautista MARCH Staff     Name of Who is Calling:     What is the request in detail: patient request call back in reference to reschedule appointment to 3 months from today /  patient prefer Monday or Tuesday Please contact to further discuss and advise      Can the clinic reply by [facility]:     [facility]   [phone]

## 2024-07-10 NOTE — PROGRESS NOTES
Subjective:       Patient ID: Lesley Estrada is a 49 y.o. female.    Chief Complaint: Follow Up    Diagnosis: Cervical Cancer Stage 2B     Current Treatment: None    Treatment History:   Concurrent chemo/RT with Cisplatin 40 mg q.week x6 cycles- 4/6/23--->5/11/23    HPI  47 yr old who presents for evaluation of cervical cancer in March 2023. Upon initial diagnosis in January, she had the option of definitive chemo/XRT vs total hysterectomy and decided to proceed with surgery with Dr. Rhodes. Unfortunately at the time of surgery, an exam was done that revealed obvious tumor extension onto the anterior of the vagina with some possible involvement of the left lateral vaginal apex. The tumor itself was thought to be approximately 5cm at the time of bimanual exam on 3/20. Given these new progressive findings it was decided that patient would be a better candidate for curative intent if she underwent concurrent chemo/RT. She underwent concurrent chemo RT with weekly cisplatin 40mg in April - May '23. She tolerated therapy well. Follow up MRI of the pelvis with complete resolution of cervical mass and LAD. She is now under surveillance with repeat PET 3-4 months after radiation showing no evidence of disease.     Interval History:  Patient returns to clinic for a four month follow up. She feels well today without any complaints or concerns. She is followed by Dr. Baum every 6 months and Dr. Gandhi every 6 months. Next appt with Dr. Baum 10/2024. She denies any neuropathy, shortness of breath, chest pain, pain, fatigue, recent hospitalizations or illnesses. She denies any changes in her hearing, dizziness or vaginal bleeding. Labs reviewed with patient and .        Past Medical History:   Diagnosis Date    ADD (attention deficit disorder)     Cancer 2023    Cervical Cancer    Essential (primary) hypertension     Generalized anxiety disorder       Past Surgical History:   Procedure Laterality Date    Arm  surgery Left     HAD STAPH INFECTION  IN ARM AND WAS DRAINED    PLACEMENT, MEDIPORT N/A 4/10/2023    Procedure: Placement, Mediport;  Surgeon: Alexus Pollack MD;  Location: [facility] OR;  Service: General;  Laterality: N/A;     Social History     Socioeconomic History    Marital status:    Tobacco Use    Smoking status: Never    Smokeless tobacco: Never   Substance and Sexual Activity    Alcohol use: Yes     Comment: 4 X WEEK    Drug use: Never    Sexual activity: Yes     Partners: Male      Family History   Problem Relation Name Age of Onset    Breast cancer Mother  56    Other Father          Had stints in his heart    Ovarian cancer Maternal Grandmother          50s    Breast cancer Maternal Grandmother          late 80s    Stomach cancer Maternal Grandfather          late 60s, early 70s    Kidney failure Paternal Grandmother      Diabetes Paternal Grandmother      Suicide Paternal Grandfather      Breast cancer Paternal Aunt  75      Review of patient's allergies indicates:  No Known Allergies       Review of Systems   Constitutional:  Negative for activity change, appetite change, chills, fatigue, fever and unexpected weight change.   HENT:  Negative for facial swelling, mouth sores, nosebleeds, sinus pressure/congestion and sore throat.    Eyes:  Negative for photophobia, pain and visual disturbance.   Respiratory:  Negative for cough, chest tightness, shortness of breath and wheezing.    Cardiovascular:  Negative for chest pain, palpitations and leg swelling.   Gastrointestinal:  Negative for abdominal pain, blood in stool, change in bowel habit, constipation, diarrhea, nausea and vomiting.   Endocrine: Negative.  Negative for polyuria.   Genitourinary:  Negative for dysuria, frequency, hematuria, hot flashes, pelvic pain, urgency and vaginal pain.   Musculoskeletal:  Negative for arthralgias, gait problem, joint swelling and myalgias.   Integumentary:  Negative for pallor, rash, wound, breast mass,  breast discharge and breast tenderness.   Allergic/Immunologic: Negative.  Negative for immunocompromised state.   Neurological:  Negative for dizziness, vertigo, syncope, weakness, numbness and headaches.   Hematological:  Negative for adenopathy. Does not bruise/bleed easily.   Psychiatric/Behavioral:  Negative for behavioral problems, confusion and dysphoric mood. The patient is not nervous/anxious.    All other systems reviewed and are negative.  Breast: Negative for mass and tenderness        Objective:      Vitals:    07/11/24 1055   BP: 113/78   Pulse: 93   Resp: 20   Temp: 98 °F (36.7 °C)         Physical Exam  Constitutional:       General: She is not in acute distress.     Appearance: Normal appearance. She is not ill-appearing.   HENT:      Head: Normocephalic and atraumatic.      Nose: Nose normal.      Mouth/Throat:      Mouth: Mucous membranes are moist.      Pharynx: Oropharynx is clear.   Eyes:      Extraocular Movements: Extraocular movements intact.      Conjunctiva/sclera: Conjunctivae normal.      Pupils: Pupils are equal, round, and reactive to light.   Cardiovascular:      Rate and Rhythm: Normal rate and regular rhythm.      Pulses: Normal pulses.      Heart sounds: Normal heart sounds. No murmur heard.  Pulmonary:      Effort: Pulmonary effort is normal. No respiratory distress.      Breath sounds: Normal breath sounds.   Abdominal:      General: There is no distension.      Palpations: Abdomen is soft.      Tenderness: There is no abdominal tenderness.   Musculoskeletal:         General: Normal range of motion.      Cervical back: Normal range of motion and neck supple.      Right lower leg: No edema.      Left lower leg: No edema.   Lymphadenopathy:      Cervical: No cervical adenopathy.      Upper Body:      Right upper body: No supraclavicular or axillary adenopathy.      Left upper body: No supraclavicular or axillary adenopathy.      Comments: No adenopathy noted bilaterally.   Skin:      General: Skin is warm and dry.   Neurological:      General: No focal deficit present.      Mental Status: She is alert and oriented to person, place, and time.         LABS AND IMAGING REVIEWED IN [facility]    Pathology:  6/21/22: Joshua MMG:IMPRESSION: NEGATIVE    5/28/22 PAP Smear:  Positive- High Donell HPV  Positive- HPV 16    12/6/22 PAP Smear:  Positive- High Risk HPV  Positive- HPV 16    1/3/23 Cervical Biopsy:  Cervical Biopsy, 6 o'clock        - Squamous cell carcinoma, invasive, moderately differentiated  2.   Cervical Biopsy, 8 o'clock,         - Squamous cell carcinoma, invasive, moderately differentiated  3.   Cervical Biopsy, 10 o'clock        - Squamous cell carcinoma, invasive, moderately differentiated  4.   ECC        - Squamous cell carcinoma, invasive, moderately differentiated     Comment: Squamous cell carcinoma comprises almost the entirely of the tissue. Carcinoma involves the full thickness of these biopsies and invades at least 3 mm.    6/13/23 Biopsy of R breast:     IMAGING    2/3/2023 PET/CT:    Hypermetabolic activity noted within the cervix. No regional or distant metastatic disease.  2/10/23 MRI Pelvis:  4.3cm diameter primary cervical mass. No extension into the parametrial fat appreciated. Small but suspicious appearing left external iliac chain lymph nodes  9/5/23 PET/CT: Complete metabolic response of previously identified cervical mass. No evidence of metastatic disease. Previously identified focus of metabolic activity in the cervix is no longer identified. No abnormal uptake is seen.      Assessment:   Stage 2B SCC of Cervix    Family history of cancer - Seen by Genetics in April '23        Plan:   Mediport flush today and [id number]  Keep f/u with Rad/On   Keep f/u with Dr. Baum   RTC in 4 months with NP with labs  Cbc, cmp, ca 125- 1 hr prior @ [facility]    I spent a total of 30 minutes on the day of the visit.This includes face to face time and non-face to face time preparing to see the  patient (eg, review of tests), obtaining and/or reviewing separately obtained history, documenting clinical information in the electronic or other health record, independently interpreting results and communicating results to the patient/family/caregiver, or care coordinator.    SAIRA Preston-C  Oncology/Hematology   Cancer Center [facility]

## 2024-07-11 ENCOUNTER — OFFICE VISIT (OUTPATIENT)
Dept: HEMATOLOGY/ONCOLOGY | Facility: CLINIC | Age: 49
End: 2024-07-11
Payer: MEDICAID

## 2024-07-11 ENCOUNTER — INFUSION (OUTPATIENT)
Dept: INFUSION THERAPY | Facility: HOSPITAL | Age: 49
End: 2024-07-11
Payer: MEDICAID

## 2024-07-11 VITALS
HEIGHT: 65 IN | DIASTOLIC BLOOD PRESSURE: 78 MMHG | RESPIRATION RATE: 20 BRPM | WEIGHT: 189.88 LBS | BODY MASS INDEX: 31.64 KG/M2 | SYSTOLIC BLOOD PRESSURE: 113 MMHG | OXYGEN SATURATION: 98 % | HEART RATE: 93 BPM | TEMPERATURE: 98 F

## 2024-07-11 VITALS
HEIGHT: 65 IN | HEART RATE: 93 BPM | RESPIRATION RATE: 20 BRPM | WEIGHT: 189.88 LBS | BODY MASS INDEX: 31.64 KG/M2 | SYSTOLIC BLOOD PRESSURE: 113 MMHG | DIASTOLIC BLOOD PRESSURE: 78 MMHG | OXYGEN SATURATION: 98 % | TEMPERATURE: 98 F

## 2024-07-11 DIAGNOSIS — C53.9 MALIGNANT NEOPLASM OF CERVIX, UNSPECIFIED SITE: Primary | ICD-10-CM

## 2024-07-11 DIAGNOSIS — C53.8 MALIGNANT NEOPLASM OF OVERLAPPING SITES OF CERVIX: Primary | ICD-10-CM

## 2024-07-11 DIAGNOSIS — C53.8 MALIGNANT NEOPLASM OF OVERLAPPING SITES OF CERVIX: ICD-10-CM

## 2024-07-11 PROCEDURE — 96523 IRRIG DRUG DELIVERY DEVICE: CPT

## 2024-07-11 PROCEDURE — 99215 OFFICE O/P EST HI 40 MIN: CPT | Mod: PBBFAC

## 2024-07-11 PROCEDURE — 1160F RVW MEDS BY RX/DR IN RCRD: CPT | Mod: CPTII,,,

## 2024-07-11 PROCEDURE — 3074F SYST BP LT 130 MM HG: CPT | Mod: CPTII,,,

## 2024-07-11 PROCEDURE — 3078F DIAST BP <80 MM HG: CPT | Mod: CPTII,,,

## 2024-07-11 PROCEDURE — A4216 STERILE WATER/SALINE, 10 ML: HCPCS

## 2024-07-11 PROCEDURE — 1159F MED LIST DOCD IN RCRD: CPT | Mod: CPTII,,,

## 2024-07-11 PROCEDURE — 99214 OFFICE O/P EST MOD 30 MIN: CPT | Mod: S$PBB,,,

## 2024-07-11 PROCEDURE — 3008F BODY MASS INDEX DOCD: CPT | Mod: CPTII,,,

## 2024-07-11 PROCEDURE — 63600175 PHARM REV CODE 636 W HCPCS

## 2024-07-11 PROCEDURE — 25000003 PHARM REV CODE 250

## 2024-07-11 PROCEDURE — 99999 PR PBB SHADOW E&M-EST. PATIENT-LVL V: CPT | Mod: PBBFAC,,,

## 2024-07-11 RX ORDER — HEPARIN 100 UNIT/ML
500 SYRINGE INTRAVENOUS
Status: DISCONTINUED | OUTPATIENT
Start: 2024-07-11 | End: 2024-07-11 | Stop reason: HOSPADM

## 2024-07-11 RX ORDER — SODIUM CHLORIDE 0.9 % (FLUSH) 0.9 %
10 SYRINGE (ML) INJECTION
Status: DISCONTINUED | OUTPATIENT
Start: 2024-07-11 | End: 2024-07-11 | Stop reason: HOSPADM

## 2024-07-11 RX ORDER — HEPARIN 100 UNIT/ML
500 SYRINGE INTRAVENOUS
OUTPATIENT
Start: 2024-07-11

## 2024-07-11 RX ORDER — SODIUM CHLORIDE 0.9 % (FLUSH) 0.9 %
10 SYRINGE (ML) INJECTION
OUTPATIENT
Start: 2024-07-11

## 2024-07-11 RX ADMIN — HEPARIN SODIUM (PORCINE) LOCK FLUSH IV SOLN 100 UNIT/ML 500 UNITS: 100 SOLUTION at 11:07

## 2024-07-11 RX ADMIN — SODIUM CHLORIDE, PRESERVATIVE FREE 10 ML: 5 INJECTION INTRAVENOUS at 11:07

## 2024-07-11 NOTE — PLAN OF CARE
Patient will have no S&S of infection from Mediport. Will perform sterile technique when flushing Mediport

## 2025-02-14 NOTE — PROGRESS NOTES
Subjective:       Patient ID: Lesley Estrada is a 49 y.o. female.    Chief Complaint: follow-up Malignant neoplasm of cervix, unspecified site (Pt has no complaints.)    Diagnosis: Cervical Cancer Stage 2B     Current Treatment: None    Treatment History:   Concurrent chemo/RT with Cisplatin 40 mg q.week x6 cycles- 4/6/23--->5/11/23    HPI  47 yr old who presents for evaluation of cervical cancer in March 2023. Upon initial diagnosis in January, she had the option of definitive chemo/XRT vs total hysterectomy and decided to proceed with surgery with Dr. Rhodes. Unfortunately at the time of surgery, an exam was done that revealed obvious tumor extension onto the anterior of the vagina with some possible involvement of the left lateral vaginal apex. The tumor itself was thought to be approximately 5cm at the time of bimanual exam on 3/20. Given these new progressive findings it was decided that patient would be a better candidate for curative intent if she underwent concurrent chemo/RT. She underwent concurrent chemo RT with weekly cisplatin 40mg in April - May '23. She tolerated therapy well. Follow up MRI of the pelvis with complete resolution of cervical mass and LAD. She is now under surveillance with repeat PET 3-4 months after radiation showing no evidence of disease.     Interval History:  Patient returns to clinic for a four month follow up. She feels well today without any complaints or concerns. She is followed by Dr. Baum every 6 months and Dr. Gandhi every 6 months. She was seen by Dr. Gandhi last week with normal exam. Next appt with Dr. Baum 4/2025. She denies any neuropathy, shortness of breath, chest pain, pain, fatigue, recent hospitalizations or illnesses. She denies any changes in her hearing, dizziness or vaginal bleeding. Labs reviewed with patient and .        Past Medical History:   Diagnosis Date    ADD (attention deficit disorder)     Cancer 2023    Cervical Cancer     Essential (primary) hypertension     Generalized anxiety disorder       Past Surgical History:   Procedure Laterality Date    Arm surgery Left     HAD STAPH INFECTION  IN ARM AND WAS DRAINED    PLACEMENT, MEDIPORT N/A 4/10/2023    Procedure: Placement, Mediport;  Surgeon: Alexus Pollack MD;  Location: [facility];  Service: General;  Laterality: N/A;     Social History     Socioeconomic History    Marital status:    Tobacco Use    Smoking status: Never    Smokeless tobacco: Never   Substance and Sexual Activity    Alcohol use: Yes     Comment: 4 X WEEK    Drug use: Never    Sexual activity: Yes     Partners: Male      Family History   Problem Relation Name Age of Onset    Breast cancer Mother  56    Other Father          Had stints in his heart    Ovarian cancer Maternal Grandmother          50s    Breast cancer Maternal Grandmother          late 80s    Stomach cancer Maternal Grandfather          late 60s, early 70s    Kidney failure Paternal Grandmother      Diabetes Paternal Grandmother      Suicide Paternal Grandfather      Breast cancer Paternal Aunt  75      Review of patient's allergies indicates:  No Known Allergies       Review of Systems   Constitutional:  Negative for activity change, appetite change, chills, fatigue, fever and unexpected weight change.   HENT:  Negative for facial swelling, mouth sores, nosebleeds, sinus pressure/congestion and sore throat.    Eyes:  Negative for photophobia, pain and visual disturbance.   Respiratory:  Negative for cough, chest tightness, shortness of breath and wheezing.    Cardiovascular:  Negative for chest pain, palpitations and leg swelling.   Gastrointestinal:  Negative for abdominal pain, blood in stool, change in bowel habit, constipation, diarrhea, nausea and vomiting.   Endocrine: Negative.  Negative for polyuria.   Genitourinary:  Negative for dysuria, frequency, hematuria, hot flashes, pelvic pain, urgency and vaginal pain.   Musculoskeletal:   Negative for arthralgias, gait problem, joint swelling and myalgias.   Integumentary:  Negative for pallor, rash, wound, breast mass, breast discharge and breast tenderness.   Allergic/Immunologic: Negative.  Negative for immunocompromised state.   Neurological:  Negative for dizziness, vertigo, syncope, weakness, numbness and headaches.   Hematological:  Negative for adenopathy. Does not bruise/bleed easily.   Psychiatric/Behavioral:  Negative for behavioral problems, confusion and dysphoric mood. The patient is not nervous/anxious.    All other systems reviewed and are negative.  Breast: Negative for mass and tenderness        Objective:      Vitals:    02/17/25 1032   BP: 122/68   Pulse: 69   Resp: 18   Temp: 97.7 °F (36.5 °C)           Physical Exam  Constitutional:       General: She is not in acute distress.     Appearance: Normal appearance. She is not ill-appearing.   HENT:      Head: Normocephalic and atraumatic.      Nose: Nose normal.      Mouth/Throat:      Mouth: Mucous membranes are moist.      Pharynx: Oropharynx is clear.   Eyes:      Extraocular Movements: Extraocular movements intact.      Conjunctiva/sclera: Conjunctivae normal.      Pupils: Pupils are equal, round, and reactive to light.   Cardiovascular:      Rate and Rhythm: Normal rate and regular rhythm.      Pulses: Normal pulses.      Heart sounds: Normal heart sounds. No murmur heard.  Pulmonary:      Effort: Pulmonary effort is normal. No respiratory distress.      Breath sounds: Normal breath sounds.   Abdominal:      General: There is no distension.      Palpations: Abdomen is soft.      Tenderness: There is no abdominal tenderness.   Musculoskeletal:         General: Normal range of motion.      Cervical back: Normal range of motion and neck supple.      Right lower leg: No edema.      Left lower leg: No edema.   Lymphadenopathy:      Cervical: No cervical adenopathy.      Upper Body:      Right upper body: No supraclavicular or  axillary adenopathy.      Left upper body: No supraclavicular or axillary adenopathy.      Comments: No adenopathy noted bilaterally.   Skin:     General: Skin is warm and dry.   Neurological:      General: No focal deficit present.      Mental Status: She is alert and oriented to person, place, and time.         LABS AND IMAGING REVIEWED IN [facility]    Pathology:  6/21/22: Joshua MMG:IMPRESSION: NEGATIVE    5/28/22 PAP Smear:  Positive- High Donell HPV  Positive- HPV 16    12/6/22 PAP Smear:  Positive- High Risk HPV  Positive- HPV 16    1/3/23 Cervical Biopsy:  Cervical Biopsy, 6 o'clock        - Squamous cell carcinoma, invasive, moderately differentiated  2.   Cervical Biopsy, 8 o'clock,         - Squamous cell carcinoma, invasive, moderately differentiated  3.   Cervical Biopsy, 10 o'clock        - Squamous cell carcinoma, invasive, moderately differentiated  4.   ECC        - Squamous cell carcinoma, invasive, moderately differentiated     Comment: Squamous cell carcinoma comprises almost the entirely of the tissue. Carcinoma involves the full thickness of these biopsies and invades at least 3 mm.    6/13/23 Biopsy of R breast:     IMAGING    2/3/2023 PET/CT:    Hypermetabolic activity noted within the cervix. No regional or distant metastatic disease.  2/10/23 MRI Pelvis:  4.3cm diameter primary cervical mass. No extension into the parametrial fat appreciated. Small but suspicious appearing left external iliac chain lymph nodes  9/5/23 PET/CT: Complete metabolic response of previously identified cervical mass. No evidence of metastatic disease. Previously identified focus of metabolic activity in the cervix is no longer identified. No abnormal uptake is seen.      Assessment:   Stage 2B SCC of Cervix    Doing well. Continue observation for now. Followed by Dr. Baum and Dr. 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   There is no known risk to  NTHL1 carriers (heterozygous).         Plan:   Labs and exam stable,  pending  Keep f/u with Rad/On   Keep f/u with Dr. Baum (4/1/2025)  RTC in 6 months with NP with labs  Cbc, cmp, ca 125- 1 hr prior @ [facility]    I spent a total of 30 minutes on the day of the visit.This includes face to face time and non-face to face time preparing to see the patient (eg, review of tests), obtaining and/or reviewing separately obtained history, documenting clinical information in the electronic or other health record, independently interpreting results and communicating results to the patient/family/caregiver, or care coordinator.    Marielena Shannon, ANTONINOP-C  Oncology/Hematology   Cancer Center [facility]

## 2025-02-17 ENCOUNTER — OFFICE VISIT (OUTPATIENT)
Dept: HEMATOLOGY/ONCOLOGY | Facility: CLINIC | Age: 50
End: 2025-02-17
Payer: MEDICAID

## 2025-02-17 ENCOUNTER — LAB VISIT (OUTPATIENT)
Dept: LAB | Facility: HOSPITAL | Age: 50
End: 2025-02-17
Payer: MEDICAID

## 2025-02-17 VITALS
HEART RATE: 69 BPM | TEMPERATURE: 98 F | RESPIRATION RATE: 18 BRPM | DIASTOLIC BLOOD PRESSURE: 68 MMHG | HEIGHT: 65 IN | BODY MASS INDEX: 27.66 KG/M2 | WEIGHT: 166 LBS | SYSTOLIC BLOOD PRESSURE: 122 MMHG | OXYGEN SATURATION: 100 %

## 2025-02-17 DIAGNOSIS — C53.8 MALIGNANT NEOPLASM OF OVERLAPPING SITES OF CERVIX: ICD-10-CM

## 2025-02-17 DIAGNOSIS — C53.9 MALIGNANT NEOPLASM OF CERVIX, UNSPECIFIED SITE: ICD-10-CM

## 2025-02-17 DIAGNOSIS — Z80.41 FAMILY HISTORY OF MALIGNANT NEOPLASM OF OVARY: ICD-10-CM

## 2025-02-17 DIAGNOSIS — C53.9 MALIGNANT NEOPLASM OF CERVIX, UNSPECIFIED SITE: Primary | ICD-10-CM

## 2025-02-17 DIAGNOSIS — Z80.3 FAMILY HISTORY OF BREAST CANCER: ICD-10-CM

## 2025-02-17 DIAGNOSIS — Z80.9 FAMILY HISTORY OF CANCER: ICD-10-CM

## 2025-02-17 LAB
ALBUMIN SERPL-MCNC: 4 G/DL (ref 3.5–5)
ALBUMIN/GLOB SERPL: 1.2 RATIO (ref 1.1–2)
ALP SERPL-CCNC: 73 UNIT/L (ref 40–150)
ALT SERPL-CCNC: 19 UNIT/L (ref 0–55)
ANION GAP SERPL CALC-SCNC: 5 MEQ/L
AST SERPL-CCNC: 17 UNIT/L (ref 5–34)
BASOPHILS # BLD AUTO: 0.04 X10(3)/MCL
BASOPHILS NFR BLD AUTO: 1 %
BILIRUB SERPL-MCNC: 0.3 MG/DL
BUN SERPL-MCNC: 12 MG/DL (ref 7–18.7)
CALCIUM SERPL-MCNC: 9.1 MG/DL (ref 8.4–10.2)
CANCER AG125 SERPL-ACNC: 20.8 UNIT/ML (ref 0–35)
CHLORIDE SERPL-SCNC: 106 MMOL/L (ref 98–107)
CO2 SERPL-SCNC: 30 MMOL/L (ref 22–29)
CREAT SERPL-MCNC: 0.77 MG/DL (ref 0.55–1.02)
CREAT/UREA NIT SERPL: 16
EOSINOPHIL # BLD AUTO: 0.1 X10(3)/MCL (ref 0–0.9)
EOSINOPHIL NFR BLD AUTO: 2.4 %
ERYTHROCYTE [DISTWIDTH] IN BLOOD BY AUTOMATED COUNT: 11.9 % (ref 11.5–17)
GFR SERPLBLD CREATININE-BSD FMLA CKD-EPI: >60 ML/MIN/1.73/M2
GLOBULIN SER-MCNC: 3.4 GM/DL (ref 2.4–3.5)
GLUCOSE SERPL-MCNC: 90 MG/DL (ref 74–100)
HCT VFR BLD AUTO: 39.2 % (ref 37–47)
HGB BLD-MCNC: 13.4 G/DL (ref 12–16)
IMM GRANULOCYTES # BLD AUTO: 0.02 X10(3)/MCL (ref 0–0.04)
IMM GRANULOCYTES NFR BLD AUTO: 0.5 %
LYMPHOCYTES # BLD AUTO: 0.62 X10(3)/MCL (ref 0.6–4.6)
LYMPHOCYTES NFR BLD AUTO: 14.8 %
MAGNESIUM SERPL-MCNC: 2.1 MG/DL (ref 1.6–2.6)
MCH RBC QN AUTO: 31.7 PG (ref 27–31)
MCHC RBC AUTO-ENTMCNC: 34.2 G/DL (ref 33–36)
MCV RBC AUTO: 92.7 FL (ref 80–94)
MONOCYTES # BLD AUTO: 0.51 X10(3)/MCL (ref 0.1–1.3)
MONOCYTES NFR BLD AUTO: 12.2 %
NEUTROPHILS # BLD AUTO: 2.89 X10(3)/MCL (ref 2.1–9.2)
NEUTROPHILS NFR BLD AUTO: 69.1 %
NRBC BLD AUTO-RTO: 0 %
PLATELET # BLD AUTO: 336 X10(3)/MCL (ref 130–400)
PMV BLD AUTO: 8.4 FL (ref 7.4–10.4)
POTASSIUM SERPL-SCNC: 4.2 MMOL/L (ref 3.5–5.1)
PROT SERPL-MCNC: 7.4 GM/DL (ref 6.4–8.3)
RBC # BLD AUTO: 4.23 X10(6)/MCL (ref 4.2–5.4)
SODIUM SERPL-SCNC: 141 MMOL/L (ref 136–145)
WBC # BLD AUTO: 4.18 X10(3)/MCL (ref 4.5–11.5)

## 2025-02-17 PROCEDURE — 3074F SYST BP LT 130 MM HG: CPT | Mod: CPTII,,,

## 2025-02-17 PROCEDURE — 3008F BODY MASS INDEX DOCD: CPT | Mod: CPTII,,,

## 2025-02-17 PROCEDURE — 1160F RVW MEDS BY RX/DR IN RCRD: CPT | Mod: CPTII,,,

## 2025-02-17 PROCEDURE — 85025 COMPLETE CBC W/AUTO DIFF WBC: CPT

## 2025-02-17 PROCEDURE — 1159F MED LIST DOCD IN RCRD: CPT | Mod: CPTII,,,

## 2025-02-17 PROCEDURE — 80053 COMPREHEN METABOLIC PANEL: CPT

## 2025-02-17 PROCEDURE — 3078F DIAST BP <80 MM HG: CPT | Mod: CPTII,,,

## 2025-02-17 PROCEDURE — 83735 ASSAY OF MAGNESIUM: CPT

## 2025-02-17 PROCEDURE — 86304 IMMUNOASSAY TUMOR CA 125: CPT

## 2025-02-17 PROCEDURE — 36415 COLL VENOUS BLD VENIPUNCTURE: CPT

## 2025-02-17 PROCEDURE — 99215 OFFICE O/P EST HI 40 MIN: CPT | Mod: PBBFAC

## 2025-02-17 RX ORDER — FLUOXETINE HYDROCHLORIDE 40 MG/1
40 CAPSULE ORAL
COMMUNITY
Start: 2025-02-06

## 2025-02-17 RX ORDER — BUPROPION HYDROCHLORIDE 300 MG/1
300 TABLET ORAL
COMMUNITY
Start: 2025-02-06

## (undated) DEVICE — ELECTRODE PATIENT RETURN DISP

## (undated) DEVICE — GLOVE PROTEXIS BLUE LATEX 7

## (undated) DEVICE — SUT STRATAFIX 4-0 30CM PS-2

## (undated) DEVICE — GOWN X-LG STERILE BACK

## (undated) DEVICE — BLADE SURG STAINLESS STEEL #11

## (undated) DEVICE — Device

## (undated) DEVICE — GLOVE PROTEXIS PI SYN SURG 6.5

## (undated) DEVICE — DRESSING TRANS 4X4 TEGADERM

## (undated) DEVICE — SUPPORT ULNA NERVE PROTECTOR

## (undated) DEVICE — SOL NACL .9P 500ML

## (undated) DEVICE — SUT MCRYL PLUS 3-0 PS2 27IN

## (undated) DEVICE — COVER PROBE US 5.5X58L NON LTX

## (undated) DEVICE — BAG MEDI-PLAST DECANTER C-FLOW

## (undated) DEVICE — NDL SYR 10ML 18X1.5 LL BLUNT

## (undated) DEVICE — GLOVE 6.0 PROTEXIS PI MICRO

## (undated) DEVICE — DRESSING TEGADERM CHG 3.5X4.5

## (undated) DEVICE — GLOVE PROTEXIS PI SYN SURG 6.0

## (undated) DEVICE — ELECTRODE BLADE INSULATED 1 IN

## (undated) DEVICE — ADHESIVE DERMABOND ADVANCED

## (undated) DEVICE — NDL HYPO REG 25G X 1 1/2

## (undated) DEVICE — CLOSURE SKIN STERI STRIP 1/2X4

## (undated) DEVICE — COVER C-ARM STRAP BAND 44X80IN